# Patient Record
Sex: FEMALE | Race: OTHER | Employment: PART TIME | URBAN - METROPOLITAN AREA
[De-identification: names, ages, dates, MRNs, and addresses within clinical notes are randomized per-mention and may not be internally consistent; named-entity substitution may affect disease eponyms.]

---

## 2016-07-28 LAB
ANTIBODY SCREEN, EXTERNAL: NEGATIVE
CHLAMYDIA, EXTERNAL: NEGATIVE
HBSAG, EXTERNAL: NEGATIVE
HIV, EXTERNAL: NORMAL
N. GONORRHEA, EXTERNAL: NEGATIVE
RPR, EXTERNAL: NORMAL
RUBELLA, EXTERNAL: NORMAL
T. PALLIDUM, EXTERNAL: NEGATIVE
TYPE, ABO & RH, EXTERNAL: NORMAL

## 2017-01-13 ENCOUNTER — ROUTINE PRENATAL (OUTPATIENT)
Dept: FAMILY MEDICINE CLINIC | Age: 32
End: 2017-01-13

## 2017-01-13 NOTE — MR AVS SNAPSHOT
Visit Information Nafisa Fonseca Personal Médico Departamento Teléfono del Dep. Número de visita 1/13/2017 11:15 AM Minda Arrieta, Erwin Pritchett [phone] Your Appointments 1/17/2017  3:35 PM  
OB VISIT with Stephanie Larkin MD  
Erwin Pritchett ([address] [place] [address] Road) Appt Note: 33 weeks ob  
 [address] [facility],[address] [place] [address]  
[phone]  
  
   
 [address] [place] [address],[address] [address] [place] [id number] Upcoming Health Maintenance Date Due  
 PAP AKA CERVICAL CYTOLOGY 7/26/2019 DTaP/Tdap/Td series (2 - Td) 12/7/2026 [place]  Review Complete El: 12/27/2016 Por: Stephanie Larkin MD  
 A partir del:  1/13/2017 No Known Allergies Vacunas actuales [id number] el:  11/22/2016 [place] Holiday Influenza Vaccine (Quad) PF 10/25/2016 Tdap 12/7/2016 No revisadas esta visita Partes vitales LMP (última bright) Estado obstétrico Estatus de tabaquísmo 05/23/2016 Pregnant Never Smoker [place] lista de medicamentos actualizada Lista actualizada el: 1/13/17 11:23 AM.  Luis A Mcdowelly use [place]aca lista de medicamentos más reciente. PNV no.24-iron-folic acid-dha -399 mg-mcg-mg Cmpk Take  by mouth. raNITIdine 150 mg tablet También conocido elana:  ZANTAC Take 1 Tab by mouth two (2) times a day. Introducing [facility]! Estimado Kayla : 
Adolfo por solicitar charo cuenta de MyChart shabana. Nuestros registros indican que usted ya tiene charo cuenta [facility] [place]. Usted puede acceder a [place] cuenta en cualquier momento en [email or web address] Sabía usted que usted puede acceder a [place] hospital y las instrucciones de eliceo ER en cualquier momento en MyChart ? También puede revisar [facility] de las pruebas de [place] hospitalización o visita a urgencias . Pio Moreno 
 
 Si tiene [employer] , por favor visite la sección de preguntas frecuentes del sitio web MyChart en [email or web address] . Recuerde, MyChart NO es que se utilizará para las necesidades urgentes. Para emergencias médicas , llame al 911 . Ahora disponible en hobson iPhone y Android ! Por favor proporcione monica resumen de la documentación de cuidado a hobson próximo proveedor. Your primary care clinician is listed as [address] [place] [address], . If you have any questions after today's visit, please call [phone].

## 2017-01-17 ENCOUNTER — ROUTINE PRENATAL (OUTPATIENT)
Dept: FAMILY MEDICINE CLINIC | Age: 32
End: 2017-01-17

## 2017-01-17 VITALS
TEMPERATURE: 97.8 F | HEART RATE: 100 BPM | DIASTOLIC BLOOD PRESSURE: 72 MMHG | SYSTOLIC BLOOD PRESSURE: 104 MMHG | HEIGHT: 62 IN | WEIGHT: 158 LBS | OXYGEN SATURATION: 97 % | BODY MASS INDEX: 29.08 KG/M2 | RESPIRATION RATE: 16 BRPM

## 2017-01-17 DIAGNOSIS — Z34.83 PRENATAL CARE, SUBSEQUENT PREGNANCY IN THIRD TRIMESTER: ICD-10-CM

## 2017-01-17 DIAGNOSIS — L29.9 PRURITUS: Primary | ICD-10-CM

## 2017-01-17 NOTE — PROGRESS NOTES
Presents for prenatal care visit    + fetal movement  Denies bleeding or LOF    States that she has burning of the skin over her abdomen; states that in areas that it also feels numb  States that she occasionally has itching of the palms of her hands, during the day    Taking her prenatal vitamin    PE:  abd skin -- no sign of rash or excoriation; mild erythema, possibly secondary to rubbing across her skin    Will send labs for CMP and bile acids to rule out IHCP

## 2017-01-18 LAB
ALBUMIN SERPL-MCNC: 3.5 G/DL (ref 3.5–5.5)
ALBUMIN/GLOB SERPL: 1.6 {RATIO} (ref 1.1–2.5)
ALP SERPL-CCNC: 104 IU/L (ref 39–117)
ALT SERPL-CCNC: 45 IU/L (ref 0–32)
AST SERPL-CCNC: 33 IU/L (ref 0–40)
BILE AC SERPL-SCNC: 9.6 UMOL/L (ref 4.7–24.5)
BILIRUB SERPL-MCNC: 0.2 MG/DL (ref 0–1.2)
BUN SERPL-MCNC: 8 MG/DL (ref 6–20)
BUN/CREAT SERPL: 16 (ref 8–20)
CALCIUM SERPL-MCNC: 8.4 MG/DL (ref 8.7–10.2)
CHLORIDE SERPL-SCNC: 101 MMOL/L (ref 96–106)
CO2 SERPL-SCNC: 20 MMOL/L (ref 18–29)
CREAT SERPL-MCNC: 0.49 MG/DL (ref 0.57–1)
GLOBULIN SER CALC-MCNC: 2.2 G/DL (ref 1.5–4.5)
GLUCOSE SERPL-MCNC: 155 MG/DL (ref 65–99)
POTASSIUM SERPL-SCNC: 3.7 MMOL/L (ref 3.5–5.2)
PROT SERPL-MCNC: 5.7 G/DL (ref 6–8.5)
SODIUM SERPL-SCNC: 138 MMOL/L (ref 134–144)

## 2017-01-24 ENCOUNTER — ROUTINE PRENATAL (OUTPATIENT)
Dept: FAMILY MEDICINE CLINIC | Age: 32
End: 2017-01-24

## 2017-01-24 VITALS
WEIGHT: 157 LBS | HEART RATE: 109 BPM | HEIGHT: 62 IN | SYSTOLIC BLOOD PRESSURE: 97 MMHG | RESPIRATION RATE: 12 BRPM | TEMPERATURE: 98.7 F | DIASTOLIC BLOOD PRESSURE: 63 MMHG | BODY MASS INDEX: 28.89 KG/M2 | OXYGEN SATURATION: 96 %

## 2017-01-24 DIAGNOSIS — Z34.93 PRENATAL CARE, THIRD TRIMESTER: ICD-10-CM

## 2017-01-24 DIAGNOSIS — Z34.83 PRENATAL CARE, SUBSEQUENT PREGNANCY IN THIRD TRIMESTER: Primary | ICD-10-CM

## 2017-01-24 LAB
BILIRUB UR QL STRIP: NEGATIVE
GLUCOSE UR-MCNC: NEGATIVE MG/DL
KETONES P FAST UR STRIP-MCNC: NEGATIVE MG/DL
PH UR STRIP: 7.5 [PH] (ref 4.6–8)
PROT UR QL STRIP: NORMAL MG/DL
SP GR UR STRIP: 1.02 (ref 1–1.03)
UA UROBILINOGEN AMB POC: NORMAL (ref 0.2–1)
URINALYSIS CLARITY POC: NORMAL
URINALYSIS COLOR POC: YELLOW
URINE BLOOD POC: NEGATIVE
URINE LEUKOCYTES POC: NORMAL
URINE NITRITES POC: NEGATIVE

## 2017-01-24 NOTE — PROGRESS NOTES
Presents for prenatal care visit  + fetal movement  Denies bleeding or LOF    Advised that lab work was normal    GBS cx done     -- vaginal delivery at [facility]

## 2017-01-26 LAB
GP B STREP DNA SPEC QL NAA+PROBE: NEGATIVE
GRBS, EXTERNAL: NEGATIVE

## 2017-02-01 ENCOUNTER — ROUTINE PRENATAL (OUTPATIENT)
Dept: FAMILY MEDICINE CLINIC | Age: 32
End: 2017-02-01

## 2017-02-01 VITALS
HEART RATE: 100 BPM | TEMPERATURE: 98 F | SYSTOLIC BLOOD PRESSURE: 111 MMHG | HEIGHT: 62 IN | BODY MASS INDEX: 28.71 KG/M2 | DIASTOLIC BLOOD PRESSURE: 75 MMHG | OXYGEN SATURATION: 98 % | WEIGHT: 156 LBS

## 2017-02-01 DIAGNOSIS — Z34.83 PRENATAL CARE, SUBSEQUENT PREGNANCY IN THIRD TRIMESTER: Primary | ICD-10-CM

## 2017-02-01 LAB
BILIRUB UR QL STRIP: NEGATIVE
GLUCOSE UR-MCNC: NEGATIVE MG/DL
KETONES P FAST UR STRIP-MCNC: NEGATIVE MG/DL
PH UR STRIP: 7 [PH] (ref 4.6–8)
PROT UR QL STRIP: NEGATIVE MG/DL
SP GR UR STRIP: 1.01 (ref 1–1.03)
UA UROBILINOGEN AMB POC: NORMAL (ref 0.2–1)
URINALYSIS CLARITY POC: NORMAL
URINALYSIS COLOR POC: YELLOW
URINE BLOOD POC: NEGATIVE
URINE LEUKOCYTES POC: NORMAL
URINE NITRITES POC: NEGATIVE

## 2017-02-01 NOTE — PROGRESS NOTES
Chief Complaint   Patient presents with    Routine Prenatal Visit     36w2d     1. Have you been to the ER, urgent care clinic since your last visit? Hospitalized since your last visit? No    2. Have you seen or consulted any other health care providers outside of the [address] [place] [address] since your last visit? Include any pap smears or colon screening.  No

## 2017-02-01 NOTE — PROGRESS NOTES
Presents for prenatal care visit  + fetal movement  Denies bleeding or LOF    Denies any preeclampsia sxs    Occasional BH contractions, described as non-painful    GBS negative    F/U with Dr. Shelia Ngo next week

## 2017-02-08 ENCOUNTER — HOSPITAL ENCOUNTER (INPATIENT)
Age: 32
LOS: 3 days | Discharge: HOME | End: 2017-02-11
Attending: FAMILY MEDICINE | Admitting: OBSTETRICS & GYNECOLOGY
Payer: SUBSIDIZED

## 2017-02-08 ENCOUNTER — APPOINTMENT (OUTPATIENT)
Dept: GENERAL RADIOLOGY | Age: 32
End: 2017-02-08
Attending: FAMILY MEDICINE
Payer: SUBSIDIZED

## 2017-02-08 ENCOUNTER — SURGERY (OUTPATIENT)
Age: 32
End: 2017-02-08

## 2017-02-08 ENCOUNTER — ROUTINE PRENATAL (OUTPATIENT)
Dept: FAMILY MEDICINE CLINIC | Age: 32
End: 2017-02-08

## 2017-02-08 ENCOUNTER — ANESTHESIA EVENT (OUTPATIENT)
Dept: LABOR AND DELIVERY | Age: 32
End: 2017-02-08
Payer: SUBSIDIZED

## 2017-02-08 ENCOUNTER — ANESTHESIA (OUTPATIENT)
Dept: LABOR AND DELIVERY | Age: 32
End: 2017-02-08
Payer: SUBSIDIZED

## 2017-02-08 VITALS
HEIGHT: 62 IN | DIASTOLIC BLOOD PRESSURE: 74 MMHG | SYSTOLIC BLOOD PRESSURE: 109 MMHG | BODY MASS INDEX: 30.22 KG/M2 | TEMPERATURE: 98.4 F | HEART RATE: 97 BPM | WEIGHT: 164.2 LBS

## 2017-02-08 DIAGNOSIS — O36.8130 DECREASED FETAL MOVEMENT, THIRD TRIMESTER, NOT APPLICABLE OR UNSPECIFIED FETUS: ICD-10-CM

## 2017-02-08 DIAGNOSIS — Z34.93 PRENATAL CARE IN THIRD TRIMESTER: Primary | ICD-10-CM

## 2017-02-08 PROBLEM — O36.8190 DECREASED FETAL MOVEMENT DETERMINED BY EXAMINATION: Status: ACTIVE | Noted: 2017-02-08

## 2017-02-08 LAB
BILIRUB UR QL STRIP: NEGATIVE
GLUCOSE UR-MCNC: NEGATIVE MG/DL
KETONES P FAST UR STRIP-MCNC: NEGATIVE MG/DL
PH UR STRIP: 7.5 [PH] (ref 4.6–8)
PROT UR QL STRIP: NORMAL MG/DL
SP GR UR STRIP: 1.02 (ref 1–1.03)
UA UROBILINOGEN AMB POC: NORMAL (ref 0.2–1)
URINALYSIS CLARITY POC: CLEAR
URINALYSIS COLOR POC: YELLOW
URINE BLOOD POC: NEGATIVE
URINE LEUKOCYTES POC: NORMAL
URINE NITRITES POC: NEGATIVE

## 2017-02-08 PROCEDURE — 76060000033 HC ANESTHESIA 1 TO 1.5 HR: Performed by: OBSTETRICS & GYNECOLOGY

## 2017-02-08 PROCEDURE — 77030018836 HC SOL IRR NACL ICUM -A

## 2017-02-08 PROCEDURE — 65270000029 HC RM PRIVATE

## 2017-02-08 PROCEDURE — 77030002974 HC SUT PLN J&J -A

## 2017-02-08 PROCEDURE — 77030034850

## 2017-02-08 PROCEDURE — 76210000016 HC OR PH I REC 1 TO 1.5 HR

## 2017-02-08 PROCEDURE — 76060000078 HC EPIDURAL ANESTHESIA: Performed by: ANESTHESIOLOGY

## 2017-02-08 PROCEDURE — C1765 ADHESION BARRIER: HCPCS

## 2017-02-08 PROCEDURE — 74011250636 HC RX REV CODE- 250/636: Performed by: ANESTHESIOLOGY

## 2017-02-08 PROCEDURE — 77030032490 HC SLV COMPR SCD KNE COVD -B

## 2017-02-08 PROCEDURE — 99218 HC RM OBSERVATION: CPT

## 2017-02-08 PROCEDURE — 74000 XR ABD (KUB): CPT

## 2017-02-08 PROCEDURE — 74011250636 HC RX REV CODE- 250/636

## 2017-02-08 PROCEDURE — 76010000391 HC C SECN FIRST 1 HR: Performed by: OBSTETRICS & GYNECOLOGY

## 2017-02-08 PROCEDURE — 77030002933 HC SUT MCRYL J&J -A

## 2017-02-08 PROCEDURE — 77030008684 HC TU ET CUF COVD -B: Performed by: ANESTHESIOLOGY

## 2017-02-08 PROCEDURE — 76010000392 HC C SECN EA ADDL 0.5 HR: Performed by: OBSTETRICS & GYNECOLOGY

## 2017-02-08 PROCEDURE — 77030026438 HC STYL ET INTUB CARD -A: Performed by: ANESTHESIOLOGY

## 2017-02-08 PROCEDURE — 77030011640 HC PAD GRND REM COVD -A

## 2017-02-08 PROCEDURE — 77010026065 HC OXYGEN MINIMUM MEDICAL AIR: Performed by: OBSTETRICS & GYNECOLOGY

## 2017-02-08 PROCEDURE — 75410000003 HC RECOV DEL/VAG/CSECN EA 0.5 HR: Performed by: OBSTETRICS & GYNECOLOGY

## 2017-02-08 PROCEDURE — 99281 EMR DPT VST MAYX REQ PHY/QHP: CPT | Performed by: OBSTETRICS & GYNECOLOGY

## 2017-02-08 PROCEDURE — 77030008463 HC STPLR SKN PROX J&J -B

## 2017-02-08 PROCEDURE — 77010026064 HC OXYGEN INFANT MED AIR MIN: Performed by: OBSTETRICS & GYNECOLOGY

## 2017-02-08 PROCEDURE — 36415 COLL VENOUS BLD VENIPUNCTURE: CPT | Performed by: FAMILY MEDICINE

## 2017-02-08 PROCEDURE — 77030031139 HC SUT VCRL2 J&J -A

## 2017-02-08 RX ORDER — SODIUM CHLORIDE 0.9 % (FLUSH) 0.9 %
5-10 SYRINGE (ML) INJECTION AS NEEDED
Status: DISCONTINUED | OUTPATIENT
Start: 2017-02-08 | End: 2017-02-11 | Stop reason: HOSPADM

## 2017-02-08 RX ORDER — SIMETHICONE 80 MG
80 TABLET,CHEWABLE ORAL
Status: DISCONTINUED | OUTPATIENT
Start: 2017-02-08 | End: 2017-02-11 | Stop reason: HOSPADM

## 2017-02-08 RX ORDER — CEFAZOLIN SODIUM 1 G/3ML
INJECTION, POWDER, FOR SOLUTION INTRAMUSCULAR; INTRAVENOUS AS NEEDED
Status: DISCONTINUED | OUTPATIENT
Start: 2017-02-08 | End: 2017-02-08 | Stop reason: HOSPADM

## 2017-02-08 RX ORDER — DEXAMETHASONE SODIUM PHOSPHATE 4 MG/ML
INJECTION, SOLUTION INTRA-ARTICULAR; INTRALESIONAL; INTRAMUSCULAR; INTRAVENOUS; SOFT TISSUE AS NEEDED
Status: DISCONTINUED | OUTPATIENT
Start: 2017-02-08 | End: 2017-02-08 | Stop reason: HOSPADM

## 2017-02-08 RX ORDER — SODIUM CHLORIDE 0.9 % (FLUSH) 0.9 %
5-10 SYRINGE (ML) INJECTION EVERY 8 HOURS
Status: CANCELLED | OUTPATIENT
Start: 2017-02-08

## 2017-02-08 RX ORDER — NALOXONE HYDROCHLORIDE 0.4 MG/ML
0.4 INJECTION, SOLUTION INTRAMUSCULAR; INTRAVENOUS; SUBCUTANEOUS AS NEEDED
Status: DISCONTINUED | OUTPATIENT
Start: 2017-02-08 | End: 2017-02-11 | Stop reason: HOSPADM

## 2017-02-08 RX ORDER — HYDROMORPHONE HYDROCHLORIDE 1 MG/ML
0.5 INJECTION, SOLUTION INTRAMUSCULAR; INTRAVENOUS; SUBCUTANEOUS
Status: ACTIVE | OUTPATIENT
Start: 2017-02-08 | End: 2017-02-09

## 2017-02-08 RX ORDER — DIPHENHYDRAMINE HYDROCHLORIDE 50 MG/ML
12.5 INJECTION, SOLUTION INTRAMUSCULAR; INTRAVENOUS
Status: ACTIVE | OUTPATIENT
Start: 2017-02-08 | End: 2017-02-09

## 2017-02-08 RX ORDER — MIDAZOLAM HYDROCHLORIDE 1 MG/ML
1 INJECTION, SOLUTION INTRAMUSCULAR; INTRAVENOUS
Status: DISCONTINUED | OUTPATIENT
Start: 2017-02-08 | End: 2017-02-08 | Stop reason: HOSPADM

## 2017-02-08 RX ORDER — ONDANSETRON 2 MG/ML
INJECTION INTRAMUSCULAR; INTRAVENOUS AS NEEDED
Status: DISCONTINUED | OUTPATIENT
Start: 2017-02-08 | End: 2017-02-08 | Stop reason: HOSPADM

## 2017-02-08 RX ORDER — SODIUM CHLORIDE, SODIUM LACTATE, POTASSIUM CHLORIDE, CALCIUM CHLORIDE 600; 310; 30; 20 MG/100ML; MG/100ML; MG/100ML; MG/100ML
125 INJECTION, SOLUTION INTRAVENOUS CONTINUOUS
Status: DISCONTINUED | OUTPATIENT
Start: 2017-02-08 | End: 2017-02-11 | Stop reason: HOSPADM

## 2017-02-08 RX ORDER — SWAB
1 SWAB, NON-MEDICATED MISCELLANEOUS DAILY
Status: DISCONTINUED | OUTPATIENT
Start: 2017-02-09 | End: 2017-02-11 | Stop reason: HOSPADM

## 2017-02-08 RX ORDER — ZOLPIDEM TARTRATE 5 MG/1
5 TABLET ORAL
Status: DISCONTINUED | OUTPATIENT
Start: 2017-02-08 | End: 2017-02-11 | Stop reason: HOSPADM

## 2017-02-08 RX ORDER — OXYTOCIN/RINGER'S LACTATE 20/1000 ML
125-500 PLASTIC BAG, INJECTION (ML) INTRAVENOUS ONCE
Status: COMPLETED | OUTPATIENT
Start: 2017-02-08 | End: 2017-02-09

## 2017-02-08 RX ORDER — OXYTOCIN 10 [USP'U]/ML
INJECTION, SOLUTION INTRAMUSCULAR; INTRAVENOUS AS NEEDED
Status: DISCONTINUED | OUTPATIENT
Start: 2017-02-08 | End: 2017-02-08 | Stop reason: HOSPADM

## 2017-02-08 RX ORDER — SUCCINYLCHOLINE CHLORIDE 20 MG/ML
INJECTION INTRAMUSCULAR; INTRAVENOUS AS NEEDED
Status: DISCONTINUED | OUTPATIENT
Start: 2017-02-08 | End: 2017-02-08 | Stop reason: HOSPADM

## 2017-02-08 RX ORDER — KETOROLAC TROMETHAMINE 30 MG/ML
INJECTION, SOLUTION INTRAMUSCULAR; INTRAVENOUS AS NEEDED
Status: DISCONTINUED | OUTPATIENT
Start: 2017-02-08 | End: 2017-02-08 | Stop reason: HOSPADM

## 2017-02-08 RX ORDER — NALOXONE HYDROCHLORIDE 0.4 MG/ML
0.4 INJECTION, SOLUTION INTRAMUSCULAR; INTRAVENOUS; SUBCUTANEOUS AS NEEDED
Status: CANCELLED | OUTPATIENT
Start: 2017-02-08

## 2017-02-08 RX ORDER — OXYCODONE AND ACETAMINOPHEN 5; 325 MG/1; MG/1
1 TABLET ORAL
Status: DISCONTINUED | OUTPATIENT
Start: 2017-02-08 | End: 2017-02-11 | Stop reason: HOSPADM

## 2017-02-08 RX ORDER — MIDAZOLAM HYDROCHLORIDE 1 MG/ML
INJECTION, SOLUTION INTRAMUSCULAR; INTRAVENOUS AS NEEDED
Status: DISCONTINUED | OUTPATIENT
Start: 2017-02-08 | End: 2017-02-08 | Stop reason: HOSPADM

## 2017-02-08 RX ORDER — NALOXONE HYDROCHLORIDE 0.4 MG/ML
0.2 INJECTION, SOLUTION INTRAMUSCULAR; INTRAVENOUS; SUBCUTANEOUS
Status: DISCONTINUED | OUTPATIENT
Start: 2017-02-08 | End: 2017-02-11 | Stop reason: HOSPADM

## 2017-02-08 RX ORDER — ZOLPIDEM TARTRATE 5 MG/1
5 TABLET ORAL
Status: CANCELLED | OUTPATIENT
Start: 2017-02-08

## 2017-02-08 RX ORDER — SODIUM CHLORIDE 0.9 % (FLUSH) 0.9 %
5-10 SYRINGE (ML) INJECTION EVERY 8 HOURS
Status: DISCONTINUED | OUTPATIENT
Start: 2017-02-08 | End: 2017-02-09 | Stop reason: SDUPTHER

## 2017-02-08 RX ORDER — FENTANYL CITRATE 50 UG/ML
INJECTION, SOLUTION INTRAMUSCULAR; INTRAVENOUS AS NEEDED
Status: DISCONTINUED | OUTPATIENT
Start: 2017-02-08 | End: 2017-02-08 | Stop reason: HOSPADM

## 2017-02-08 RX ORDER — SODIUM CHLORIDE, SODIUM LACTATE, POTASSIUM CHLORIDE, CALCIUM CHLORIDE 600; 310; 30; 20 MG/100ML; MG/100ML; MG/100ML; MG/100ML
100 INJECTION, SOLUTION INTRAVENOUS CONTINUOUS
Status: CANCELLED | OUTPATIENT
Start: 2017-02-08

## 2017-02-08 RX ORDER — SODIUM CHLORIDE, SODIUM LACTATE, POTASSIUM CHLORIDE, CALCIUM CHLORIDE 600; 310; 30; 20 MG/100ML; MG/100ML; MG/100ML; MG/100ML
INJECTION, SOLUTION INTRAVENOUS
Status: DISCONTINUED | OUTPATIENT
Start: 2017-02-08 | End: 2017-02-08 | Stop reason: HOSPADM

## 2017-02-08 RX ORDER — OXYTOCIN/RINGER'S LACTATE 20/1000 ML
125-500 PLASTIC BAG, INJECTION (ML) INTRAVENOUS ONCE
Status: CANCELLED | OUTPATIENT
Start: 2017-02-08 | End: 2017-02-08

## 2017-02-08 RX ORDER — SIMETHICONE 80 MG
80 TABLET,CHEWABLE ORAL AS NEEDED
Status: CANCELLED | OUTPATIENT
Start: 2017-02-08

## 2017-02-08 RX ORDER — HYDROMORPHONE HCL IN 0.9% NACL 15 MG/30ML
PATIENT CONTROLLED ANALGESIA VIAL INTRAVENOUS CONTINUOUS
Status: DISPENSED | OUTPATIENT
Start: 2017-02-08 | End: 2017-02-09

## 2017-02-08 RX ORDER — PROPOFOL 10 MG/ML
INJECTION, EMULSION INTRAVENOUS AS NEEDED
Status: DISCONTINUED | OUTPATIENT
Start: 2017-02-08 | End: 2017-02-08 | Stop reason: HOSPADM

## 2017-02-08 RX ORDER — HYDROMORPHONE HYDROCHLORIDE 2 MG/ML
INJECTION, SOLUTION INTRAMUSCULAR; INTRAVENOUS; SUBCUTANEOUS AS NEEDED
Status: DISCONTINUED | OUTPATIENT
Start: 2017-02-08 | End: 2017-02-08 | Stop reason: HOSPADM

## 2017-02-08 RX ORDER — IBUPROFEN 800 MG/1
800 TABLET ORAL EVERY 8 HOURS
Status: DISCONTINUED | OUTPATIENT
Start: 2017-02-08 | End: 2017-02-11 | Stop reason: HOSPADM

## 2017-02-08 RX ORDER — HYDROMORPHONE HYDROCHLORIDE 1 MG/ML
1 INJECTION, SOLUTION INTRAMUSCULAR; INTRAVENOUS; SUBCUTANEOUS
Status: DISCONTINUED | OUTPATIENT
Start: 2017-02-08 | End: 2017-02-11 | Stop reason: HOSPADM

## 2017-02-08 RX ORDER — SODIUM CHLORIDE 0.9 % (FLUSH) 0.9 %
5-10 SYRINGE (ML) INJECTION AS NEEDED
Status: CANCELLED | OUTPATIENT
Start: 2017-02-08

## 2017-02-08 RX ORDER — HYDROMORPHONE HYDROCHLORIDE 1 MG/ML
0.5 INJECTION, SOLUTION INTRAMUSCULAR; INTRAVENOUS; SUBCUTANEOUS
Status: DISCONTINUED | OUTPATIENT
Start: 2017-02-08 | End: 2017-02-08 | Stop reason: HOSPADM

## 2017-02-08 RX ADMIN — DEXAMETHASONE SODIUM PHOSPHATE 4 MG: 4 INJECTION, SOLUTION INTRA-ARTICULAR; INTRALESIONAL; INTRAMUSCULAR; INTRAVENOUS; SOFT TISSUE at 18:32

## 2017-02-08 RX ADMIN — HYDROMORPHONE HYDROCHLORIDE 1 MG: 2 INJECTION, SOLUTION INTRAMUSCULAR; INTRAVENOUS; SUBCUTANEOUS at 18:31

## 2017-02-08 RX ADMIN — MIDAZOLAM HYDROCHLORIDE 2 MG: 1 INJECTION, SOLUTION INTRAMUSCULAR; INTRAVENOUS at 18:51

## 2017-02-08 RX ADMIN — OXYTOCIN 20 UNITS: 10 INJECTION, SOLUTION INTRAMUSCULAR; INTRAVENOUS at 18:36

## 2017-02-08 RX ADMIN — SUCCINYLCHOLINE CHLORIDE 100 MG: 20 INJECTION INTRAMUSCULAR; INTRAVENOUS at 17:58

## 2017-02-08 RX ADMIN — HYDROMORPHONE HYDROCHLORIDE 0.5 MG: 2 INJECTION, SOLUTION INTRAMUSCULAR; INTRAVENOUS; SUBCUTANEOUS at 18:42

## 2017-02-08 RX ADMIN — ONDANSETRON 4 MG: 2 INJECTION INTRAMUSCULAR; INTRAVENOUS at 18:32

## 2017-02-08 RX ADMIN — SODIUM CHLORIDE, SODIUM LACTATE, POTASSIUM CHLORIDE, CALCIUM CHLORIDE: 600; 310; 30; 20 INJECTION, SOLUTION INTRAVENOUS at 18:36

## 2017-02-08 RX ADMIN — FENTANYL CITRATE 150 MCG: 50 INJECTION, SOLUTION INTRAMUSCULAR; INTRAVENOUS at 18:04

## 2017-02-08 RX ADMIN — PROPOFOL 200 MG: 10 INJECTION, EMULSION INTRAVENOUS at 17:57

## 2017-02-08 RX ADMIN — FENTANYL CITRATE 100 MCG: 50 INJECTION, SOLUTION INTRAMUSCULAR; INTRAVENOUS at 18:09

## 2017-02-08 RX ADMIN — KETOROLAC TROMETHAMINE 30 MG: 30 INJECTION, SOLUTION INTRAMUSCULAR; INTRAVENOUS at 18:14

## 2017-02-08 RX ADMIN — HYDROMORPHONE HYDROCHLORIDE 0.5 MG: 2 INJECTION, SOLUTION INTRAMUSCULAR; INTRAVENOUS; SUBCUTANEOUS at 18:22

## 2017-02-08 RX ADMIN — OXYTOCIN 20 UNITS: 10 INJECTION, SOLUTION INTRAMUSCULAR; INTRAVENOUS at 17:58

## 2017-02-08 RX ADMIN — Medication: at 19:12

## 2017-02-08 RX ADMIN — CEFAZOLIN SODIUM 2 G: 1 INJECTION, POWDER, FOR SOLUTION INTRAMUSCULAR; INTRAVENOUS at 18:02

## 2017-02-08 RX ADMIN — SODIUM CHLORIDE, SODIUM LACTATE, POTASSIUM CHLORIDE, CALCIUM CHLORIDE: 600; 310; 30; 20 INJECTION, SOLUTION INTRAVENOUS at 17:57

## 2017-02-08 RX ADMIN — HYDROMORPHONE HYDROCHLORIDE 1 MG: 1 INJECTION, SOLUTION INTRAMUSCULAR; INTRAVENOUS; SUBCUTANEOUS at 19:49

## 2017-02-08 NOTE — PROGRESS NOTES
Patient is   37w2d  GBS neg    Follow-up OB visit    Chief Complaint   Patient presents with    Routine Prenatal Visit     37w2d       Vitals:    17 1540   BP: 109/74   Pulse: 97   Temp: 98.4 °F (36.9 °C)   TempSrc: Oral   Weight: 164 lb 3.2 oz (74.5 kg)   Height: 5' 2\" (1.575 m)       Patient Active Problem List    Diagnosis Date Noted    Vaginal discharge during pregnancy in third trimester 2016    Cough 10/25/2016    Gastroesophageal reflux disease without esophagitis 10/25/2016    Prenatal care, subsequent pregnancy in third trimester 2016       The patient reports the following concerns: pelvic pain    Flu vaccine: received this season  Tdap: complete    See PN flowsheet for exam    36 y.o.  37w2d   No diagnosis found. [] SAB/bleeding precautions reviewed   [] PTL/PPROM precautions reviewed   [] Labor precautions reviewed   [] Fetal kick counts discussed   [] Labs reviewed with patient   [] Cristiano Shark precautions reviewed   [] Consent reviewed   [] Handouts given to pt   [] Glucola handout    [] GBS/labor/Magic Hour handout   []    []    []    []    Follow-up Disposition: Not on File    No orders of the defined types were placed in this encounter.         Lexy Whittington MD

## 2017-02-08 NOTE — IP AVS SNAPSHOT
Current Discharge Medication List  
  
Take these medications at their scheduled times Dose & Instructions Dispensing Information Comments Morning Noon Evening Bedtime  
 docusate sodium 100 mg capsule Commonly known as:  Rosanne Dilling Your next dose is: Today, Tomorrow Other:  ____________ Dose:  100 mg Take 1 Cap by mouth two (2) times a day for 90 days. Indications: Constipation Quantity:  60 Cap Refills:  2  
     
   
   
   
  
 raNITIdine 150 mg tablet Commonly known as:  ZANTAC Your next dose is: Today, Tomorrow Other:  ____________ Dose:  150 mg Take 1 Tab by mouth two (2) times a day. Quantity:  60 Tab Refills:  1 Take these medications as needed Dose & Instructions Dispensing Information Comments Morning Noon Evening Bedtime  
 ibuprofen 800 mg tablet Commonly known as:  MOTRIN Your next dose is: Today, Tomorrow Other:  ____________ Dose:  800 mg Take 1 Tab by mouth every eight (8) hours as needed for Pain. Quantity:  90 Tab Refills:  0  
     
   
   
   
  
 oxyCODONE-acetaminophen 5-325 mg per tablet Commonly known as:  PERCOCET Your next dose is: Today, Tomorrow Other:  ____________ Dose:  1 Tab Take 1 Tab by mouth every four (4) hours as needed. Max Daily Amount: 6 Tabs. Quantity:  20 Tab Refills:  0  
     
   
   
   
  
 zolpidem 5 mg tablet Commonly known as:  AMBIEN Your next dose is: Today, Tomorrow Other:  ____________ Dose:  5 mg Take 1 Tab by mouth nightly as needed for Sleep. Max Daily Amount: 5 mg. Quantity:  15 Tab Refills:  0 Take these medications as directed Dose & Instructions Dispensing Information Comments Morning Noon Evening Bedtime PNV no.24-iron-folic acid-dha -674 mg-mcg-mg Cmpk Your next dose is: Today, Tomorrow Other:  ____________ Take  by mouth. Refills:  0 Where to Get Your Medications Information about where to get these medications is not yet available ! Ask your nurse or doctor about these medications  
  docusate sodium 100 mg capsule  
 ibuprofen 800 mg tablet  
 oxyCODONE-acetaminophen 5-325 mg per tablet  
 zolpidem 5 mg tablet

## 2017-02-08 NOTE — PATIENT INSTRUCTIONS
Week 37 of Your Pregnancy: Care Instructions  Your Care Instructions    You are near the end of your pregnancy--and you're probably pretty uncomfortable. It may be harder to walk around. Lying down probably isn't comfortable either. You may have trouble getting to sleep or staying asleep. Most women deliver their babies between 40 and 41 weeks. This is a good time to think about packing a bag for the hospital with items you'll need. Then you'll be ready when labor starts. Follow-up care is a key part of your treatment and safety. Be sure to make and go to all appointments, and call your doctor if you are having problems. It's also a good idea to know your test results and keep a list of the medicines you take. How can you care for yourself at home? Learn about breastfeeding  · Breastfeeding is best for your baby and good for you. · Breast milk has antibodies to help your baby fight infections. · Mothers who breastfeed often lose weight faster, because making milk burns calories. · Learning the best ways to hold your baby will make breastfeeding easier. · Let your partner bathe and diaper the baby to keep your partner from feeling left out. Snuggle together when you breastfeed. · You may want to learn how to use a breast pump and store your milk. · If you choose to bottle feed, make the feeding feel like breastfeeding so you can bond with your baby. Always hold your baby and the bottle. Do not prop bottles or let your baby fall asleep with a bottle. Learn about crying  · It is common for babies to cry for 1 to 3 hours a day. Some cry more, some cry less. · Babies don't cry to make you upset or because you are a bad parent. · Crying is how your baby communicates. Your baby may be hungry; have gas; need a diaper change; or feel cold, warm, tired, lonely, or tense. Sometimes babies cry for unknown reasons. · If you respond to your baby's needs, he or she will learn to trust you.   · Try to stay calm when your baby cries. Your baby may get more upset if he or she senses that you are upset. Know how to care for your   · Your baby's umbilical cord stump will drop off on its own, usually between 1 and 2 weeks. To care for your baby's umbilical cord area:  ¨ Clean the area at the bottom of the cord 2 or 3 times a day. ¨ Pay special attention to the area where the cord attaches to the skin. ¨ Keep the diaper folded below the cord. ¨ Use a damp washcloth or cotton ball to sponge bathe your baby until the stump has come off. · Your baby's first dark stool is called meconium. After the meconium is passed, your baby will develop his or her own bowel pattern. ¨ Some babies, especially  babies, have several bowel movements a day. Others have one or two a day, or one every 2 to 3 days. ¨  babies often have loose, yellow stools. Formula-fed babies have more formed stools. ¨ If your baby's stools look like little pellets, he or she is constipated. After 2 days of constipation, call your baby's doctor. · If your baby will be circumcised, you can care for him at home. ¨ Gently rinse his penis with warm water after every diaper change. Do not try to remove the film that forms on the penis. This film will go away on its own. Pat dry. ¨ Put petroleum ointment, such as Vaseline, on the area of the diaper that will touch your baby's penis. This will keep the diaper from sticking to your baby. ¨ Ask the doctor about giving your baby acetaminophen (Tylenol) for pain. Where can you learn more? Go to http://rui-pj.info/. Enter 68 21 [id number] in the search box to learn more about \"Week 37 of Your Pregnancy: Care Instructions. \"  Current as of: May 30, 2016  Content Version: 11.1  © 7522-4232 [employer]. Care instructions adapted under license by [email or web address] (which disclaims liability or warranty for this information).  If you have questions about a medical condition or this instruction, always ask your healthcare professional. [place] [address] any warranty or liability for your use of this information.

## 2017-02-08 NOTE — ANESTHESIA PREPROCEDURE EVALUATION
Anesthetic History               Review of Systems / Medical History      Pulmonary                   Neuro/Psych              Cardiovascular                  Exercise tolerance: >4 METS     GI/Hepatic/Renal                Endo/Other             Other Findings              Physical Exam    Airway        Mouth opening: Normal     Cardiovascular               Dental         Pulmonary                 Abdominal  GI exam deferred       Other Findings            Anesthetic Plan    ASA: 2, emergent  Anesthesia type: general      Post-op pain plan if not by surgeon: IV PCA    Induction: Intravenous and RSI  Anesthetic plan and risks discussed with: Patient      NOTE: Pt came to hospital and was immediately taken for stat  due to fetal distress. Pt in OR on my arrival, limited ability to complete preop exam; This note written late at [address] [place] [address], discussed plan with patient prior to induction of anesthesia.

## 2017-02-08 NOTE — IP AVS SNAPSHOT
Cecilia Ventura 104 1007 [facility] 
[phone] Patient: Praveen Suggs MRN: [id number] MSQ:6/45/6253 You are allergic to the following No active allergies Recent Documentation Height Weight Breastfeeding? BMI OB Status Smoking Status 1.575 m 74.4 kg No 30 kg/m2 Pregnant Never Smoker Unresulted Labs Order Current Status SAMPLE TO BLOOD BANK In process Emergency Contacts Name Discharge Info Relation Home Work Mobile Jackeline Mchughcy DISCHARGE CAREGIVER [3] Other Relative [6] [phone] Eliza Longoria [5] [phone] About your hospitalization You were admitted on:  February 8, 2017 You last received care in the:  [facility] 2 LABOR & DELIVERY You were discharged on:  February 11, 2017 Unit phone number:  [phone] Why you were hospitalized Your primary diagnosis was:  Not on File Your diagnoses also included:  Decreased Fetal Movement Determined By Examination Providers Seen During Your Hospitalizations Provider Role Specialty Primary office phone Vikram Crook MD Attending Provider [facility] [phone] Your Primary Care Physician (PCP) Primary Care Physician Office Phone Office Fax ISAÍAS ZACARIAS [phone] ** None ** Follow-up Information Follow up With Details Comments Contact Info Vikram Crook MD Go on 2/15/2017 at 10.05 pm, For follow up visit [address] [place] [address] [employer] [address] [place] [address] 
[phone] Your Appointments Wednesday February 15, 2017 10:05 AM EST  
OB VISIT with Vikram Crook MD  
[address] [place] [address])  
 [address] [place] [address]  
[phone] Current Discharge Medication List  
  
START taking these medications Dose & Instructions Dispensing Information Comments Morning Noon Evening Bedtime  
 docusate sodium 100 mg capsule Commonly known as:  Annasael Jelani Your next dose is: Today, Tomorrow Other:  _________ Dose:  100 mg Take 1 Cap by mouth two (2) times a day for 90 days. Indications: Constipation Quantity:  60 Cap Refills:  2  
     
   
   
   
  
 ibuprofen 800 mg tablet Commonly known as:  MOTRIN Your next dose is: Today, Tomorrow Other:  _________ Dose:  800 mg Take 1 Tab by mouth every eight (8) hours as needed for Pain. Quantity:  90 Tab Refills:  0  
     
   
   
   
  
 oxyCODONE-acetaminophen 5-325 mg per tablet Commonly known as:  PERCOCET Your next dose is: Today, Tomorrow Other:  _________ Dose:  1 Tab Take 1 Tab by mouth every four (4) hours as needed. Max Daily Amount: 6 Tabs. Quantity:  20 Tab Refills:  0  
     
   
   
   
  
 zolpidem 5 mg tablet Commonly known as:  AMBIEN Your next dose is: Today, Tomorrow Other:  _________ Dose:  5 mg Take 1 Tab by mouth nightly as needed for Sleep. Max Daily Amount: 5 mg. Quantity:  15 Tab Refills:  0 CONTINUE these medications which have NOT CHANGED Dose & Instructions Dispensing Information Comments Morning Noon Evening Bedtime PNV no.24-iron-folic acid-dha -602 mg-mcg-mg Cmpk Your next dose is: Today, Tomorrow Other:  _________ Take  by mouth. Refills:  0  
     
   
   
   
  
 raNITIdine 150 mg tablet Commonly known as:  ZANTAC Your next dose is: Today, Tomorrow Other:  _________ Dose:  150 mg Take 1 Tab by mouth two (2) times a day. Quantity:  60 Tab Refills:  1 Where to Get Your Medications Information on where to get these meds will be given to you by the nurse or doctor. ! Ask your nurse or doctor about these medications  
  docusate sodium 100 mg capsule  
 ibuprofen 800 mg tablet  
 oxyCODONE-acetaminophen 5-325 mg per tablet  
 zolpidem 5 mg tablet Discharge Instructions Parto por cesárea: Jenness Ormond en el hogar - [  Section: What to Expect at [facility] ] Menchaca recuperación Un parto por cesárea, o simplemente cesárea, es charo cirugía mediante la cual se da a luigi a un bebé a través de un allie, llamado incisión, que hace el médico en la parte baja del abdomen y [place]. Es posible que sienta dolor en la parte baja del abdomen y que necesite analgésicos (medicamentos para el dolor) ben 1 o 2 semanas. Puede esperar tener algo de sangrado vaginal ben varias semanas. Es probable que necesite unas 6 semanas para recuperarse completamente. Es importante que se tome las cosas con calma mientras chuyita la incisión. Evite levantar objetos pesados, hacer actividades vigorosas o hacer ejercicios que pudieran esforzar los músculos abdominales mientras se recupera. [place] a un familiar o amigo que la ayude con las tareas domésticas, la comida y las compras. Esta hoja de [employer] idea general del tiempo que le llevará recuperarse. Sin embargo, cada persona se recupera a un ritmo diferente. Siga los pasos que se mencionan a continuación para recuperarse lo más rápido posible. Cómo puede cuidarse en el [facility]? Actividad · Descanse cuando se sienta cansada. Dormir lo suficiente la ayudará a recuperarse. · Intente caminar todos los días. Comience caminando un poco más de lo que caminó el día anterior. Poco a poco, aumente la distancia. Caminar mejora el flujo de [place] a prevenir la neumonía, el estreñimiento y los coágulos de [place]. · Evite las actividades intensas, elana montar en bicicleta, trotar, levantar pesas y hacer ejercicios aeróbicos ben 6 semanas o hasta que menchaca médico lo apruebe. · Hasta que hobson médico lo apruebe, no levante nada más pesado que hobson bebé. · No regis abdominales ni ningún otro ejercicio que [employer] músculos del abdomen ben 6 semanas o hasta que hobson médico lo apruebe. · Sostenga charo almohada sobre la incisión al toser o respirar profundamente. Lindaakylie Rosenberg apoyo a hobson abdomen y reducirá el dolor. · Puede ducharse elana de costumbre. Seque la incisión con toques suaves de toalla cuando termine. · Tendrá algo de sangrado vaginal. Use toallas sanitarias. No se regis lavados vaginales ni use tampones hasta que el médico se lo permita. · Pregúntele a hobson médico cuándo puede volver a conducir. · Es probable que necesite ausentarse del trabajo por lo menos 6 semanas. [place] dependerá del tipo de trabajo que regis y de cómo se sienta. · Pregúntele a hobson médico cuándo puede tener [employer]. Alimentación · Puede continuar con hobson dieta normal. Si tiene [employer], coma alimentos suaves bajos en grasa, elana arroz sin condimentar, debbi a la scott, pan dimitri y yogur. · Radha abundantes líquidos (a menos que hobson médico le indique lo contrario). · Podría notar que no evacua el intestino con regularidad ricky después de la [facility]. [place] es común. Trate de evitar el estreñimiento y no hacer esfuerzos cuando evacua el intestino. Solis vez desee alison un suplemento de [employer]. Si no ha evacuado el intestino después de un par de días, pregúntele a hobson médico si puede alison un laxante suave. · Si está amamantando, no radha alcohol. Medicamentos · Hobson médico le dirá si puede volver a alison pilar medicamentos y cuándo puede volver a hacerlo. También le dará indicaciones sobre cualquier medicamento nuevo que deba alison usted.  
· Si momo medicamentos que previenen la formación de coágulos de [place], elana warfarina (Coumadin), clopidogrel (Plavix) o aspirina, asegúrese de hablar con hobson médico. Él o diomedes le dirá si debe volver a alison [employer] medicamentos y en qué momento. Asegúrese de que entiende exactamente lo que el médico quiere que regis. · [employer] analgésicos (medicamentos para el dolor) exactamente elana le fueron indicados. ¨ Si el médico le recetó un analgésico, tómelo según las indicaciones. ¨ Si no está tomando un analgésico recetado, pregúntele a hobson médico si puede alison bernadette de [employer]. · Si le parece que el analgésico le está produciendo malestar estomacal: [address] comidas (a menos que hobson médico le haya indicado lo contrario). ¨ [place] al médico un analgésico diferente. · Si hobson médico le recetó antibióticos, tómelos según las indicaciones. No deje de tomarlos por el hecho de sentirse mejor. Debe alison todos los antibióticos hasta terminarlos. Cuidado de la incisión · Si tiene tiras de cinta [employer] incisión, déjeselas puestas ben charo semana o hasta que se caigan por sí solas. · Lave la quan a diario con [place] tibia y séquela con toques suaves de toalla. No use peróxido de hidrógeno [place]) o alcohol porque pueden retrasar la sanación. Podría cubrir la quan con charo venda de gasa si supura o roza contra la ropa. Cambie la venda todos los días. · Mantenga la quan limpia y seca. Otras instrucciones · Si amamanta a hobson bebé, cam vez le resulte más cómodo, ben el proceso de sanación, sostener al bebé de [place] en la que no se apoye en hobson abdomen. Intente poner a hobson bebé debajo del brazo, con el cuerpo del lado que lo Devin Borjas a amamantar. Sostenga la parte superior del cuerpo de hobson bebé con hobson Phylicia Mends. Con violeta mano usted puede controlar la jaqui de hobson bebé para acercarle la boca a hobson seno. [place] se conoce a veces elana \"posición de balón de fútbol americano\". La atención de seguimiento es charo parte clave de hobson tratamiento y seguridad. Asegúrese de hacer y acudir a todas las citas, y llame a hobson médico si está teniendo problemas.  También es charo buena idea [employer] resultados de pilar exámenes y mantener charo lista de los medicamentos que momo. Cuándo debe pedir ayuda? Llame al 911 en cualquier momento que considere que necesita atención de [place]. Por ejemplo, llame si: 
· Se desmayó (perdió el conocimiento). · Tiene síntomas de un coágulo de opal en el pulmón (llamado embolia pulmonar). Estos pueden incluir: ¨ Dolor repentino en el pecho. ¨ Dificultad para respirar. ¨ Toser opal. · Piensa en hacerse daño a sí misma o en lastimar a hobson bebé o a otra persona. Llame a hobson médico ahora mismo o busque atención médica inmediata si: · Tiene sangrado vaginal intenso. [place] significa que empapa charo toalla sanitaria cada hora ben 2 horas o más. · Se siente mareada o aturdida, o elana si fuera a desmayarse. · Tiene dolor abdominal nuevo o que empeora. · Tiene puntos de sutura flojos o se le abre la incisión. · Tiene síntomas de infección, tales elana: ¨ Aumento del dolor, la hinchazón, el enrojecimiento o la temperatura. ¨ Vetas rojizas que salen de la incisión. ¨ Pus que supura de la incisión. Zen Lea. · Tiene síntomas de un coágulo de opal en la pierna (llamado trombosis venosa profunda), tales elana: ¨ Dolor en la pantorrilla, el muslo, la jorge o detrás de la rodilla. ¨ Enrojecimiento e hinchazón en la pierna o la jorge. Preste especial atención a los cambios en hobson willie y asegúrese de comunicarse con hobson médico si: 
· Se siente vel, ansiosa o desesperanzada ben más de unos días. · No mejora elana se esperaba. Dónde puede encontrar más información en inglés? Curry Chloe a http://rui-pj.info/. Cynthia Rivera S156 en la búsqueda para aprender más acerca de \"Parto por cesárea: Fernando Ravi en el [facility] - [  Section: What to Expect at [facility] ]. \" 
Revisado: 30 solomon, 2016 Versión del contenido: 11.1 © 7986-3894 [employer].  Las instrucciones de cuidado fueron adaptadas bajo licencia por [employer] (which disclaims liability or warranty for this information). Si usted tiene [place] afección médica o sobre estas instrucciones, siempre pregunte a hobson profesional de willie. [employer] niega toda garantía o responsabilidad por hobson uso de esta información. Discharge Orders None [email or web address] Announcement We are excited to announce that we are making your provider's discharge notes available to you in [email or web address]. You will see these notes when they are completed and signed by the physician that discharged you from your recent hospital stay. If you have any questions or concerns about any information you see in [employer]It[employer], please call the Health Information Department where you were seen or reach out to your Primary Care Provider for more information about your plan of care. Introducing [facility]! Dear Arminda Baldwin: Thank you for requesting a [employer]It[employer]Us account. Our records indicate that you already have an active [employer] account. You can access your account anytime at [email or web address]. [employer] Did you know that you can access your hospital and ER discharge instructions at any time in [employer]It[employer]? You can also review all of your test results from your hospital stay or ER visit. Additional Information If you have questions, please visit the Frequently Asked Questions section of the [employer]It[employer] website at [email or web address] [email or web address]/. Remember, CookIt[employer]Us is NOT to be used for urgent needs. For medical emergencies, dial 911. Now available from your iPhone and Android! General Information Please provide this summary of care documentation to your next provider. Patient Signature:  ____________________________________________________________ Date:  ____________________________________________________________  
  
Maikol Artist  Provider Signature: ____________________________________________________________ Date:  ____________________________________________________________

## 2017-02-08 NOTE — CONSULTS
Neonatology Consultation    Name: Panchito Shi   Medical Record Number: [id number]   YOB: 1985  Today's Date: 2017                                                                 Date of Consultation:  2017  Time: 6:42 PM  Attending MD: Asmita Suh  Referring Physician: Wing Moreira  Reason for Consultation: emergent C/S for fetal bradycardia    Subjective:     Prenatal Labs: O+/RI/ T pall neg/ HIV neg/Hep B neg/GBS neg  Age: 36 y.o.  [place] Law:   Estimated Date Conception:    Estimated Gestation:RITU 17 for EGA 37    Objective:       Per outpatient practitioner note from earlier this afternoon, mother presented for routine prenatal visit. She reported decreased fetal movement for the past several days and none today. She had a non-reactive NST in the office and was sent to [facility] for assessment and monitoring. Per RN and OB report fetus with low heart and poor variability. Taken to the OR for emergent C/S.     Mother's pregnancy appeared to otherwise be normal. 20 week ultrasound showed normal anatomy and MSAFP was normal.    Medications:   Current Facility-Administered Medications   Medication Dose Route Frequency    HYDROmorphone (PF) 15 mg/30 ml (DILAUDID) PCA   IntraVENous CONTINUOUS     Facility-Administered Medications Ordered in Other Encounters   Medication Dose Route Frequency    fentaNYL citrate (PF) injection    PRN    succinylcholine (ANECTINE) injection   IntraVENous PRN    propofol (DIPRIVAN) 10 mg/mL injection   IntraVENous PRN    lactated ringers infusion   IntraVENous CONTINUOUS    oxytocin (PITOCIN) injection   IntraVENous PRN    ceFAZolin (ANCEF) injection   IntraVENous PRN    HYDROmorphone (PF) (DILAUDID) injection    PRN    ketorolac (TORADOL) injection    PRN    dexamethasone (DECADRON) 4 mg/mL injection    PRN    ondansetron (ZOFRAN) injection    PRN     Anesthesia: []    None     []     Local         []     Epidural/Spinal [x]    General Anesthesia   Delivery:      []    Vaginal  [x]      []     Forceps             []     Vacuum  Rupture of Membrane: at delivery  Meconium Stained: meconium-stained    Resuscitation:   Apgars: 0 1 min  0 5 min  0 10 min 0 15 min    Infant brought to warmer, floppy, pale, apneic. Placed on warmer, suctioned, dried and stimulated without response. PPV started immediately and there was no chest movement noted. PIP increased and no chest movement was noted. HR ausculation attempted during preparation for intubation and was absent and chest compressions were started. Intubation attempted with 3.5 ETT. There was no chest movement following attempted intubation and CO2 detector without change. ETT removed and second intubation attempted. ETT clearly passed through the arytenoids but unable to pass ETT further. PPV with mask restarted with no chest movement. Intubation attempted with 3.0 ETT with same result. Intubation then attempted by anesthesiologist. ETT passed futher but no air movement or CO detection. Decision made to proceed with resuscitation. Infant given 0.3 ml epi at 7 min of age without response. Epi repeated at 8 min and again at 10 min without response. UVC placed by 13  Minutes and epi 0.3 ml given via UVC. There was no HR detected and no spontaneous movement. Resuscitation discontinued at 14 1/2 minutes. Physical Exam:    Dysmorphic Features:  []    No   [x]    Yes      Remarkable findings: Infant with protuberant, tense abdomen noted when first brought to warmer. There was also generalized edema present. Both findings consistent with hydrops fetalis. Cord AB.86/123/21.2/-15.4   Cord VBG pending    Assessment:     Term fetus with presumed hydrops fetalis of unknown etiology with intrauterine demise. Plan:     Attempt to obtain consent for autopsy, if able  Provide family support as needed    Dr. Cristine Mack attempted to speak with mother in the PACU.  Per report mother woke and was very agitated. She was given a sedative by the anesthesiologist. Dr. Marylee Nova told mother that her infant was born dead and that team was not able to revive him. Mother sleepy and crying. Will attempt to speak with her later this evening, if able.        Kaelyn Fitzgerald MD 4/7/23783:89 PM

## 2017-02-08 NOTE — PROGRESS NOTES
Return OB Visit       Subjective:   Kenia Bobo 36 y.o.  37w2d. RITU: 2017, by Last Menstrual Period      Reports no VB, LOF or contractions. C/o decreased FM: states was doing kick counts the end of last week which were normal.  Over the weekend baby was making small movements but less so than normal, decreased kick counts. Hasn't felt baby move today. Isn't sure if she felt baby yesterday. States drinking plenty of water and eating normally. Immunizations- reviewed:   Immunization History   Administered Date(s) Administered    Influenza Vaccine (Quad) PF 10/25/2016    Tdap 2016     Objective:     Visit Vitals    /74 (BP 1 Location: Left arm, BP Patient Position: Sitting)    Pulse 97    Temp 98.4 °F (36.9 °C) (Oral)    Ht 5' 2\" (1.575 m)    Wt 164 lb 3.2 oz (74.5 kg)    LMP 2016    BMI 30.03 kg/m2       Physical Exam:  GENERAL APPEARANCE: NAD, pleasant  ABDOMEN: gravid  SVE: 2/60/-2  NST:  FHT present at 140-150 bpm, no accels, no decels, minimal to no variability  SONO: cephalic, OP position       Labs  Recent Results (from the past 12 hour(s))   AMB POC URINALYSIS DIP STICK AUTO W/O MICRO    Collection Time: 17  4:00 PM   Result Value Ref Range    Color (UA POC) Yellow     Clarity (UA POC) Clear     Glucose (UA POC) Negative Negative    Bilirubin (UA POC) Negative Negative    Ketones (UA POC) Negative Negative    Specific gravity (UA POC) 1.020 1.001 - 1.035    Blood (UA POC) Negative Negative    pH (UA POC) 7.5 4.6 - 8.0    Protein (UA POC) 1+ Negative mg/dL    Urobilinogen (UA POC) 0.2 mg/dL 0.2 - 1    Nitrites (UA POC) Negative Negative    Leukocyte esterase (UA POC) 1+ Negative         Assessment          ICD-10-CM ICD-9-CM    1. Prenatal care in third trimester Z34.93 V22.1 AMB POC URINALYSIS DIP STICK AUTO W/O MICRO   2.  Decreased fetal movement, third trimester, not applicable or unspecified fetus O36.8130 655.73          Plan   32yo  @ 37.2 by LMP c/w 20 week sono  1. IUP: GBS negative, RH pos, passed GTT, s/p flu and tdap, cephalic by sono in clinic   2. Decreased FM: NST nonreactive in clinic, sending to L&D for prolonged monitoring and if no improvement then IOL (pt advised to go straight from clinic to L&D, not to go home first), favorable bishops on my exam       Orders Placed This Encounter    AMB POC URINALYSIS DIP STICK AUTO W/O MICRO         Labor precautions discussed, including: Regular painful contractions, lasting for greater than one hour, taking your breath away; any vaginal bleeding; any leakage of fluid; or absent or decreased fetal movement. Call M.D. on call if any of these symptoms or signs occur. I have discussed the diagnosis with the patient and the intended plan as seen in the above orders. The patient has received an after-visit summary and questions were answered concerning future plans. I have discussed medication side effects and warnings with the patient as well. Informed pt to return to the office or go to the ER if she experiences vaginal bleeding, vaginal discharge, leaking of fluid, pelvic cramping.       Francie Brady, DO

## 2017-02-09 LAB
BASOPHILS # BLD AUTO: 0 K/UL (ref 0–0.1)
BASOPHILS # BLD: 0 % (ref 0–1)
EOSINOPHIL # BLD: 0 K/UL (ref 0–0.4)
EOSINOPHIL NFR BLD: 0 % (ref 0–7)
ERYTHROCYTE [DISTWIDTH] IN BLOOD BY AUTOMATED COUNT: 13.7 % (ref 11.5–14.5)
HCT VFR BLD AUTO: 34.3 % (ref 35–47)
HGB BLD-MCNC: 11.4 G/DL (ref 11.5–16)
LYMPHOCYTES # BLD AUTO: 10 % (ref 12–49)
LYMPHOCYTES # BLD: 1.6 K/UL (ref 0.8–3.5)
MCH RBC QN AUTO: 29.8 PG (ref 26–34)
MCHC RBC AUTO-ENTMCNC: 33.2 G/DL (ref 30–36.5)
MCV RBC AUTO: 89.6 FL (ref 80–99)
MONOCYTES # BLD: 1 K/UL (ref 0–1)
MONOCYTES NFR BLD AUTO: 6 % (ref 5–13)
NEUTS SEG # BLD: 13.7 K/UL (ref 1.8–8)
NEUTS SEG NFR BLD AUTO: 84 % (ref 32–75)
PLATELET # BLD AUTO: 195 K/UL (ref 150–400)
RBC # BLD AUTO: 3.83 M/UL (ref 3.8–5.2)
WBC # BLD AUTO: 16.3 K/UL (ref 3.6–11)

## 2017-02-09 PROCEDURE — 36415 COLL VENOUS BLD VENIPUNCTURE: CPT | Performed by: OBSTETRICS & GYNECOLOGY

## 2017-02-09 PROCEDURE — 74011250636 HC RX REV CODE- 250/636: Performed by: OBSTETRICS & GYNECOLOGY

## 2017-02-09 PROCEDURE — 99218 HC RM OBSERVATION: CPT

## 2017-02-09 PROCEDURE — 77030027138 HC INCENT SPIROMETER -A

## 2017-02-09 PROCEDURE — 88285 CHROMOSOME COUNT ADDITIONAL: CPT | Performed by: OBSTETRICS & GYNECOLOGY

## 2017-02-09 PROCEDURE — 88233 TISSUE CULTURE SKIN/BIOPSY: CPT | Performed by: OBSTETRICS & GYNECOLOGY

## 2017-02-09 PROCEDURE — 88280 CHROMOSOME KARYOTYPE STUDY: CPT | Performed by: OBSTETRICS & GYNECOLOGY

## 2017-02-09 PROCEDURE — 88307 TISSUE EXAM BY PATHOLOGIST: CPT | Performed by: OBSTETRICS & GYNECOLOGY

## 2017-02-09 PROCEDURE — 88300 SURGICAL PATH GROSS: CPT | Performed by: OBSTETRICS & GYNECOLOGY

## 2017-02-09 PROCEDURE — 85025 COMPLETE CBC W/AUTO DIFF WBC: CPT | Performed by: OBSTETRICS & GYNECOLOGY

## 2017-02-09 PROCEDURE — 65270000029 HC RM PRIVATE

## 2017-02-09 PROCEDURE — 74011000258 HC RX REV CODE- 258: Performed by: OBSTETRICS & GYNECOLOGY

## 2017-02-09 PROCEDURE — 88261 CHROMOSOME ANALYSIS 5: CPT | Performed by: OBSTETRICS & GYNECOLOGY

## 2017-02-09 PROCEDURE — 74011250637 HC RX REV CODE- 250/637: Performed by: OBSTETRICS & GYNECOLOGY

## 2017-02-09 RX ORDER — SODIUM CHLORIDE 0.9 % (FLUSH) 0.9 %
5-10 SYRINGE (ML) INJECTION AS NEEDED
Status: DISCONTINUED | OUTPATIENT
Start: 2017-02-09 | End: 2017-02-09 | Stop reason: SDUPTHER

## 2017-02-09 RX ORDER — KETOROLAC TROMETHAMINE 30 MG/ML
30 INJECTION, SOLUTION INTRAMUSCULAR; INTRAVENOUS ONCE
Status: COMPLETED | OUTPATIENT
Start: 2017-02-09 | End: 2017-02-09

## 2017-02-09 RX ORDER — SODIUM CHLORIDE, SODIUM LACTATE, POTASSIUM CHLORIDE, CALCIUM CHLORIDE 600; 310; 30; 20 MG/100ML; MG/100ML; MG/100ML; MG/100ML
125 INJECTION, SOLUTION INTRAVENOUS CONTINUOUS
Status: DISCONTINUED | OUTPATIENT
Start: 2017-02-09 | End: 2017-02-09 | Stop reason: SDUPTHER

## 2017-02-09 RX ORDER — KETOROLAC TROMETHAMINE 30 MG/ML
INJECTION, SOLUTION INTRAMUSCULAR; INTRAVENOUS
Status: DISPENSED
Start: 2017-02-09 | End: 2017-02-09

## 2017-02-09 RX ORDER — SODIUM CHLORIDE 0.9 % (FLUSH) 0.9 %
5-10 SYRINGE (ML) INJECTION EVERY 8 HOURS
Status: DISCONTINUED | OUTPATIENT
Start: 2017-02-09 | End: 2017-02-09 | Stop reason: SDUPTHER

## 2017-02-09 RX ADMIN — ZOLPIDEM TARTRATE 5 MG: 5 TABLET, FILM COATED ORAL at 04:02

## 2017-02-09 RX ADMIN — SODIUM CHLORIDE, SODIUM LACTATE, POTASSIUM CHLORIDE, AND CALCIUM CHLORIDE 125 ML/HR: 600; 310; 30; 20 INJECTION, SOLUTION INTRAVENOUS at 09:48

## 2017-02-09 RX ADMIN — KETOROLAC TROMETHAMINE 30 MG: 30 INJECTION, SOLUTION INTRAMUSCULAR at 01:15

## 2017-02-09 RX ADMIN — CEFAZOLIN SODIUM 1 G: 1 INJECTION, POWDER, FOR SOLUTION INTRAMUSCULAR; INTRAVENOUS at 09:46

## 2017-02-09 RX ADMIN — CEFAZOLIN SODIUM 1 G: 1 INJECTION, POWDER, FOR SOLUTION INTRAMUSCULAR; INTRAVENOUS at 01:13

## 2017-02-09 RX ADMIN — Medication 2500 MILLI-UNITS/HR: at 01:13

## 2017-02-09 RX ADMIN — ZOLPIDEM TARTRATE 5 MG: 5 TABLET, FILM COATED ORAL at 22:22

## 2017-02-09 RX ADMIN — CEFAZOLIN SODIUM 1 G: 1 INJECTION, POWDER, FOR SOLUTION INTRAMUSCULAR; INTRAVENOUS at 17:50

## 2017-02-09 NOTE — PROGRESS NOTES
17 2:04 PM  EMR reviewed, patient resting at this time. CM spoke with Yoon RN; patient concerned about how to talk to her daughter, 9years old, about loss. Spiritual Services provided book, The Healing Book, to assist with this, in addition to literature regarding loss and how to help children cope.  loss group information will be included in patient's discharge packet.    ADINA Hernandez

## 2017-02-09 NOTE — H&P
History & Physical    Name: Panchito Shi MRN: [id number]  SSN: [id number]    YOB: 1985  Age: 36 y.o. Sex: female        Subjective:     Estimated Date of Delivery: 17  OB History    Para Term  AB SAB TAB Ectopic Multiple Living   2 1 1       1      # Outcome Date GA Lbr Shalom/2nd Weight Sex Delivery Anes PTL Lv   2 Current            1 Term 09/15/09 40w2d  3.019 kg F VAGINAL DELI EPI N Y      Complications: Meconium in amniotic fluid          Ms. Blaine Arango is a  at 37w2d that was sent to labor and delivery because of no fetal movement and a non reactive NST. Prior to the patient's arrival I was informed by Dr. Melina Mcnamara that the patient presented for her routine OB appointment with complaint of no fetal movement for two days. I was also informed that an NST was performed in the office that showed minimal variability and no accelerations. The patient was therefore advised to go to labor and delivery for prolonged fetal monitoring. Upon arrival to labor and delivery the fetal tracing was noted to have a baseline of 135 minimal variability and in iv fluid bolus was ordered. Thirteen minutes after arrival to the labor and delivery a fetal heart rate deceleration occurred. I entered the patient's room as the fetal heart deceleration was occurring. I was informed by the patient's nurse that an bedside sono was performed and that fetal heart tones could not be obtained. I introduced myself to the patient and informed the patient that she would need an emergency . The patient was then taken to the OR for an emergency . Prenatal course was normal. Please see prenatal records for details. Past Medical History   Diagnosis Date    Pap smear for cervical cancer screening 18 months ago     No past surgical history on file. Social History     Occupational History    Not on file.      Social History Main Topics    Smoking status: Never Smoker    Smokeless tobacco: Never Used    Alcohol use No      Comment: occasional    Drug use: Not on file    Sexual activity: Yes     Partners: Male     Birth control/ protection: None     Family History   Problem Relation Age of Onset    No Known Problems Mother     No Known Problems Father        No Known Allergies  Prior to Admission medications    Medication Sig Start Date End Date Taking? Authorizing Provider   raNITIdine (ZANTAC) 150 mg tablet Take 1 Tab by mouth two (2) times a day. 10/25/16   Oswaldo Stover MD   PNV no.27-mcrg-orymq acid-dha -602 mg-mcg-mg cmpk Take  by mouth. Historical Provider        Review of Systems: Review of systems not obtained due to patient factors. Objective:     Vitals:  Vitals:    17 1940 17 1950 17   BP: 142/88 146/73 (!) 136/95 133/89   Pulse: 96 87 80 94   Resp: 16 12 11 13   Temp:    98.8 °F (37.1 °C)   SpO2: 100% 100% 100% 100%        Physical Exam:  Patient with distress.   Sve: 2-3/60 vtx (performed by Dr. Alexandra Garner in the office)  Membranes:  Intact  Fetal Heart Rate: 135 minimal variability, no accelerations, fetal bradycardia, cat 3  [place]: contractions q 1-2 minutes    Prenatal Labs:   O pos  Rub imm  HIV neg  Hep B neg  RPR NR  GC neg  Chlamydia neg  1 hr gtt- 132  3 hr gtt- wnl  MSAFP- neg  20 wk anatomy ultrasound- wnl    Assessment/Plan:   Ass/Plan: 35 yo  at 37 2/7 wks with fetal bradycardia, cat 3 fetal tracing  -emergency   -anesthesia and NICU notitifed    Signed By:  Alicja Martin MD     2017

## 2017-02-09 NOTE — PROGRESS NOTES
Post-Operative Day Number 1 Progress Note    Patient doing well post-op day 1 from  delivery without significant complaints. Pain well controlled. Lochia scant. Tolerating regular diet. Not ambulating yet. Voiding without difficulty, Rider still in place. Passing flatus. Has not had a bowel movement. Post-Op Hgb: 11.4     The pt states that emotionally she is doing OK. But she became tearful when started to discuss what happened. Her main concern is that she is unsure how to talk to her 10 yo daughter about her son's demise. We discussed and the pt knows that there is a  who can help her with the conversation with her daughter. Vitals:    Patient Vitals for the past 8 hrs:   BP Pulse Height Weight   17 0734 118/67 64 - -   17 0347 - - 5' 2\" (1.575 m) 74.4 kg (164 lb)     Temp (24hrs), Av.4 °F (36.9 °C), Min:97.8 °F (36.6 °C), Max:98.8 °F (37.1 °C)      Vital signs stable, afebrile. Exam:   Patient without distress, the pt became tearful after starting talking that                CTAB, no w/r/r/c    RRR, + S1 and S2, no m/r/g    Abdomen soft, fundus firm and at the umbilicus, non tender                Incision covered with clean bandage, appropriately tender               Lower extremities negative for swelling, no cords or tenderness, SCDs in place    Lab/Data Review:  Recent Results (from the past 12 hour(s))   CBC WITH AUTOMATED DIFF    Collection Time: 17  4:04 AM   Result Value Ref Range    WBC 16.3 (H) 3.6 - 11.0 K/uL    RBC 3.83 3.80 - 5.20 M/uL    HGB 11.4 (L) 11.5 - 16.0 g/dL    HCT 34.3 (L) 35.0 - 47.0 %    MCV 89.6 80.0 - 99.0 FL    MCH 29.8 26.0 - 34.0 PG    MCHC 33.2 30.0 - 36.5 g/dL    RDW 13.7 11.5 - 14.5 %    PLATELET 367 423 - 125 K/uL    NEUTROPHILS 84 (H) 32 - 75 %    LYMPHOCYTES 10 (L) 12 - 49 %    MONOCYTES 6 5 - 13 %    EOSINOPHILS 0 0 - 7 %    BASOPHILS 0 0 - 1 %    ABS. NEUTROPHILS 13.7 (H) 1.8 - 8.0 K/UL    ABS. LYMPHOCYTES 1.6 0.8 - 3.5 K/UL    ABS. MONOCYTES 1.0 0.0 - 1.0 K/UL    ABS. EOSINOPHILS 0.0 0.0 - 0.4 K/UL    ABS. BASOPHILS 0.0 0.0 - 0.1 K/UL         Assessment and Plan:    Aliya Mcpherson is a 36 y.o.  s/p emergent LTCS at 37w2d for fetal bradycardia, with fetal demise. Patient appears to be having uncomplicated post- course but needs good emotional support. 1. Continue routine post-op care with pain control. 2. Will remove Rider later today. 3.   The pt required good emotional support during the hospital stay. The pt is notified that she can ask for the  at any time. 4. Anticipate d/c on 2017. 5. Zolpidem 5mg at bed time as needed for sleep. 6. Will continue Ancef every 8 hours for today as the pt had emergent CS and has not received pre-op antibiotic prophylaxis.       Patient will be discussed with Dr. Delgado Kay MD  Family Medicine Resident

## 2017-02-09 NOTE — PROGRESS NOTES
Pt verbalizes desires to see the baby but is unable bring herself to see baby. Allowed time to think about it.   Declines to see baby and requests baby to be taken for autopsy

## 2017-02-09 NOTE — L&D DELIVERY NOTE
Delivery Summary    Patient: Cristiano Kirk MRN: [id number]  SSN: [id number]    YOB: 1985  Age: 36 y.o. Sex: female        Labor Events:    Labor:      Rupture Date:      Rupture Time:      Rupture Type:      Amniotic Fluid Volume:       Amniotic Fluid Description:  Amniotic fluid Odor:            Induction:           Induction Date:        Induction Time:       Indications for Induction:       Augmentation:      Augmentation Date:      Augmentation Time:      Indications for Augmentation:      Events:        Cervical Ripening:            Rupture Identifier:       Labor complications: Additional complications:         Delivery Events:  Estimated Blood Loss (ml):        Information for the patient's :  Catalina Mace [[id number]]     Delivery Summary - Baby    Delivery Date: 2017  Delivery Time: 5:57 PM  Delivery Type: , Low Transverse    Section Delivery:     Sex:  male     Gestational Age: 42w2d  Delivery Clinician:  Aixa To   Living?: Fetal Demise  Delivery Location: OR           APGARS  One minute Five minutes Ten minutes   Skin color: 0   0   0     Heart rate: 0   0   0     Grimace: 0   0   0     Muscle tone: 0   0   0     Breathin   0   0     Totals: 0   0   0        Presentation: Vertex    Position:   Occiput    Resuscitation Method:    attempted intubation x 3   Meconium Stained: Thick      Cord Vessels: 3 Vessels      Cord Events:    Cord Blood Sent?:  No    Blood Gases Sent?:  Yes    Placenta:  Date/Time:   5:58 PM  Removal: Manual Removal      Appearance: Intact      Measurements:  Birth Weight: 3.875 kg      Birth Length: 49.5 cm      Head Circumference:        Chest Circumference:       Abdominal Girth:       Other Providers:           Group Beta Strep:   Lab Results   Component Value Date/Time    Kunal, External negative 2017        Cord Blood Results:  Information for the patient's :  Samina Painter [place] High, Male [[id number]]   No results found for: Hayden Loh, PCTDIG, BILI, ABORHEXT, [address]    Information for the patient's :  Audi Cueva, Male [[id number]]     Lab Results   Component Value Date/Time    SITE OTHER 2017 06:31 PM     Information for the patient's :  Audi Cueva, Male [[id number]]   No results found for: EPHV, PCO2V, PO2V, HCO3V, O2STV, EBDV  Additional Comments: Emergency  performed for fetal bradycardia. Fetus delivered with apgars 0,0,0 despite the resuscitative efforts of the NICU team.   There was thick meconium and a loose nuchal cord x 2. The infant's appearance was consistent with fetal hydrops. Please see H+P and operative report for more details.

## 2017-02-09 NOTE — PROGRESS NOTES
pferi care rendered, pad changed, henry was not draining, and now draining. Up to soft chair with assistance. Family/visitors at bedside.

## 2017-02-09 NOTE — ANESTHESIA POSTPROCEDURE EVALUATION
Post-Anesthesia Evaluation and Assessment    Patient: Hernandez Brothers MRN: [id number]  SSN: [id number]    YOB: 1985  Age: 36 y.o. Sex: female       Cardiovascular Function/Vital Signs  Visit Vitals    /80 (BP 1 Location: Right arm, BP Patient Position: At rest)    Pulse 94    Temp 37 °C (98.6 °F)    Resp 16    SpO2 99%       Patient is status post general anesthesia for Procedure(s):   SECTION. Nausea/Vomiting: None    Postoperative hydration reviewed and adequate. Pain:  Pain Scale 1: Numeric (0 - 10) (17)  Pain Intensity 1: 0 (17)   Managed    Neurological Status:   Neuro (WDL): Within Defined Limits (17)  Neuro  LUE Motor Response: Purposeful (17)  LLE Motor Response: Purposeful (17)  RUE Motor Response: Purposeful (17)  RLE Motor Response: Purposeful (17)   At baseline    Mental Status and Level of Consciousness: Arousable    Pulmonary Status:   O2 Device: Room air (17)   Adequate oxygenation and airway patent    Complications related to anesthesia: None    Post-anesthesia assessment completed.  No concerns    Signed By: Valentin Carrero MD     2017

## 2017-02-09 NOTE — PROGRESS NOTES
I met with the patient while she was in the PACU. Dr. Mohamud Machado assisted with translation. The patient was tearful as she had already been informed about her infant's demise from the pediatrician. I gave the patient my condolences. I informed the patient that an emergency  was performed because her baby's heart rate went down and did not come back up. The patient was informed that upon delivery the baby did not have cardiac activity and was not breathing. She was informed that the NICU team worked very hard in trying to resuscitate the baby but were not able to do so. She was informed that the infant had two loose nuchal cords. Also informed that there was thick meconium. Informed that the infant appeared hydropic/edematous in appearance with a very distended abdomen. The patient started to blame herself and I reassured the patient that the outcome was not her fault. The patient was informed that an autopsy could be performed on the baby if she desired and the patient stated that she did want an autopsy. I informed the patient that I would be in the hospital all evening and would be available upon her request.  Dr. Otto Tamez and Dr. Alexandra Garner were also present to help comfort the patient.

## 2017-02-09 NOTE — PROGRESS NOTES
Annabel Jameson is attempting follow up with pt at the time, per suggestion pt might want to talk with a  re: how to talk with her 9year old daughter about her recent loss. Pt is not up for a visit at the time.  discussed with pt's RN & provided material from the [facility] bereavement terence re: how to talk with children about death & developmentally, age informed conversations with children after the loss of another child. Additionally,  provided a Devin Avalos illustrated workbook meant to engage family and children in processing grief and loss of a loved one for pt to use as a tool of tending to loss and grief with her daughter, through art. Chaplains will continue to follow as needed. Rev.  [id number] Estefany Castro M.Div, M.S, Chris 606 available at [address] [place] [phone])

## 2017-02-09 NOTE — PROGRESS NOTES
80- Pt arrives from MD office c/o decreased fetal movement. States baby has not been moving [employer]" for the past week. States felt 4 movements in an hour on 2/4/17. Went to regular appt today and was told to come to L&D for monitoring. Placed on EFM x 2.  1733- Resident sitting at desk and notified of pt arrival.   1736- IV placed and fluid bolus started. Resident at bedside. 18- Dr Snow Garcia notified of patient arrival and strip. States will be in to see patient. 1745- Decrease in FHR noted. Pt asking to get up to bathroom. Instructed pt to remain in bed on monitor. Pt turned to left side and placed on NRB. 1747- Additional nurses at bedside. Pt turned side to side attempting to find FHR. Dr Snow Garcia called again as now unable to find FHR. US to bedside, not performed. Waiting for Dr Snow Garcia. 1750- Taken to OR for STAT section. 1751- In OR at this time. Dr Jack Cochran in [facility] [address] for intubation. [address] in [facility] [address] as well.   8895- Dr Snow Garcia in [facility] [address], Dr Subha Silver in OR to assist. Attempted to locate FHR but unable. Betadine splash at this time. 1756- Pt now sedated and incision made. 5- Baby out and handed to NICU staff. 1822- Closed with staples at this time. 1825- Xray at bedside for portable xray for counts. [address]- Notified by xray that no foreign body noted. 1850- Pt cleaned and transferred to post partum bed. 1852- Out of OR and to PACU at this time. 1905- Bedside and Verbal shift change report given to Cat (oncoming nurse) by Burke Almendarez (offgoing nurse). Report included the following information SBAR.   2004- Left message for [employer] to call back.

## 2017-02-09 NOTE — PROGRESS NOTES
An 2 cm piece of skin from the baby's left heel was excised and placed in an specimen container with normal saline for genetic testing.

## 2017-02-09 NOTE — PERIOP NOTES
TRANSFER - OUT REPORT:    Verbal report given to michelle park(name) on Aixa Melton  being transferred to L and D(unit) for routine post - op       Report consisted of patients Situation, Background, Assessment and   Recommendations(SBAR). Information from the following report(s) SBAR, Procedure Summary, Intake/Output and MAR was reviewed with the receiving nurse. Lines:   Peripheral IV 02/08/17 Right Wrist (Active)   Site Assessment Clean, dry, & intact 2/8/2017  5:33 PM   Phlebitis Assessment 0 2/8/2017  5:33 PM   Infiltration Assessment 0 2/8/2017  5:33 PM   Dressing Status Clean, dry, & intact 2/8/2017  5:33 PM   Dressing Type Tape;Transparent 2/8/2017  5:33 PM   Hub Color/Line Status Pink 2/8/2017  5:33 PM   Action Taken Blood drawn 2/8/2017  5:33 PM        Opportunity for questions and clarification was provided.       Patient transported with:   Registered Nurse

## 2017-02-09 NOTE — OP NOTES
Date of Surgery: 2017    Preoperative Diagnosis:  at 37 2/7wks with decreased fetal movement, fetal bradycardia  Postoperative Diagnosis: Emergency , term male fetal demise, thick meconium, nuchal cord x 2, fetal hydrops  Procedure: Procedure(s):  Emergent Primary LSTCS      Surgeon(s) and Role:     * Doreen Thomas MD - Primary   Assistant: Dr. Lupe Gomes    Procedure Detail:     The patient was taken to the operating room where general endotracheal anesthesia was administered.  The patient was prepped and draped.    Pfannenstiel skin incision was made with the scalpel and carried down to the underlying fascia using the scapel. The fascial incision was digitally extended laterally. The rectus muscle was manually dissected off of the fascia. The peritoneum was entered bluntly and the peritoneal opening was manually stretched.  The bladder blade was then inserted.  The scapel was used to make an low transverse incision just above the bladder reflection. The hysterotomy was digitally extended superior and inferiorly. The babys was head was elevated into the uterine incision. Fundal pressure was applied and the infant's head was delivered. There were two loose nuchal cords that were manually reduced. Fundal pressure was continued and the infants's torso was delivered atraumatically.   The cord was clamped and cut and the baby was handed off to the waiting NICU team.  A segment of the umbilical cord was clamped for collection of an cord blood gas. The placenta was  delivered by manual extraction. The uterus was cleared of all clots and debris. The uterus was exteriorized.  The uterine incision was closed in two layers. The first layer with running locked layer of 0 Monocryl.  The second layer was an imbricating layer of 0 Monocryl with good hemostasis assured.  The uterus was returned to the patient's pelvis.  The paracolic gutters were cleared of blood and clots and the patient's pelvis was thoroughly irrigated with warm normal saline. Good hemostasis was reassured throughout. The peritoneum was reaproximated using 2-0 Vicryl.   The rectus bellies were reapproximated using interrupted sutures if 2-0 vicryl. The fascia was closed with 0 Vicryl in a running fashion. The subcutaneous layer was thoroughly irrigated.  Good hemostasis was assured.  The subcutaneous layer was reaproximated with a running suture of 2-0 plain.  The skin was closed with staples. Cytotec 800 micrograms was administered rectally at the end of the case for uterine atony prophylaxis. The patient tolerated the procedure well. An instrument and lap count was not done prior to surgery. An abdominal x-ray was performed that did not show any intraabdominal instruments or laps. The patient was extubated and taken to the recovery room in stable condition. Estimated Blood Loss: 600    Findings: Term male fetal demise delivered in vertex presentation.  The fetus was dusky in appearance and had no tone and was not crying upon delivery. The infant was noted to have an hydropic appearance. The infant's abdomen was very distended and taut and his skin appeared edematous. There two loose nuchal cords that were easily reduced. There was thick meconium. The placenta grossly appeared normal in appearance.   Normal appearing uterus and adnexa.     Specimens:    ID    Type    Source    Tests    Collected by    Time    Destination                                  Cord Blood Results: 6.86/123/21/-15.4  Information for the patient's :           Prenatal Labs:                                                                                                               [facility]               Signed By: Tez Day MD           2017

## 2017-02-09 NOTE — PROGRESS NOTES
Spiritual Care Assessment/Progress Notes    Mao Rogers [id number]  [id number]    1985  36 y.o.  female    Patient Telephone Number: [phone] (home)   Nondenominational Affiliation: Episcopalian   Language: English   Extended Emergency Contact Information  Primary Emergency Contact: [address] Phone: [phone]  Relation: Friend  [facility]: [phone]  Relation: Friend   Patient Active Problem List    Diagnosis Date Noted    Vaginal discharge during pregnancy in third trimester 12/07/2016    Cough 10/25/2016    Gastroesophageal reflux disease without esophagitis 10/25/2016    Prenatal care, subsequent pregnancy in third trimester 07/26/2016        Date: 2/8/2017       Level of Nondenominational/Spiritual Activity:  [x]         Involved in brenna tradition/spiritual practice    []         Not involved in brenna tradition/spiritual practice  [x]         Spiritually oriented    []         Claims no spiritual orientation    []         seeking spiritual identity  []         Feels alienated from Latter-day practice/tradition  []         Feels angry about Latter-day practice/tradition  [x]         Spirituality/Latter-day tradition is a resource for coping at this time.   []         Not able to assess due to medical condition    Services Provided Today:  []         crisis intervention    []         reading Scriptures  [x]         spiritual assessment    [x]         prayer  []         empathic listening/emotional support  []         rites and rituals (cite in comments)  []         life review     []         Latter-day support  []         theological development   []         advocacy  []         ethical dialog     []         blessing  [x]         bereavement support    [x]         support to family  []         anticipatory grief support   []         help with AMD  []         spiritual guidance    []         meditation      Spiritual Care Needs  [x]         Emotional Support  []         Spiritual/Mormonism Care  []         Loss/Adjustment  []         Advocacy/Referral                /Ethics  []         No needs expressed at               this time  []         Other: (note in               comments)  Spiritual Care Plan  []         Follow up visits with               pt/family  []         Provide materials  []         Schedule sacraments  []         Contact Community               Clergy  [x]         Follow up as needed  []         Other: (note in               comments)     Comments: Paged back for patient on L&D after anesthesia wore off for prayer for fetal demise and family. Rosemarie Vallecillo M.S., M.Div.   [facility] ([id number])

## 2017-02-09 NOTE — PROGRESS NOTES
After the surgery I met with the patient's mother along with a . The mother was informed that an emergency  was performed because of the baby's heart rate decreased and did not come back up. Informed that because of the emergent nature of the surgery her daughter had to receive general anesthesia. She was informed that the baby was delivered in an expeditious fashion but unfortunately was born without cardiac and respiratory activity. She was informed that the NICU team worked very hard to resuscitate that baby but despite their efforts were unable to do so. She was informed that the baby was born  with two loose nuchal cords. The mother was also informed that their was thick meconium. Also informed that the baby had an hydropic appearance in which his abdomen was distended and his skin was edematous. In addition she was made aware that the NICU team had difficulty attempting intubation. I gave the patient's mother my condolences and answered all of her questions.

## 2017-02-09 NOTE — PROGRESS NOTES
I spoke with the patient with the assistance of an . When asked about her pain control the patient c/o menstrual type cramping. She also states that she is very hungry and desires to eat. She feels that emotionally she is okay. She still does not want to see her baby at this point. I inquired about genetic testing and the patient states that she does want this. She also desires autopsy and to have an hospital burial.  She saw the hospital  earlier this evening. She is unsure of how to go about speaking with her 9year old daughter about her son's demise. The patient was made aware that when her daughter visits tomorrow the hospital  come by to assist with talking to her daughter. Patient informed that I will order toradol to help with her cramping and that in addition to sips of water she can have some saltines.

## 2017-02-09 NOTE — PROGRESS NOTES
Spiritual Care Assessment/Progress Notes    Malu Magaña [id number]  [id number]    1985  36 y.o.  female    Patient Telephone Number: [phone] (home)   Yarsanism Affiliation: Episcopalian   Language: English   Extended Emergency Contact Information  Primary Emergency Contact: [address] Phone: [phone]  Relation: Friend  Secondary Emergency Contact: Ricardo Candelario [place] [address] Phone: [phone]  Relation: Friend   Patient Active Problem List    Diagnosis Date Noted    Vaginal discharge during pregnancy in third trimester 12/07/2016    Cough 10/25/2016    Gastroesophageal reflux disease without esophagitis 10/25/2016    Prenatal care, subsequent pregnancy in third trimester 07/26/2016        Date: 2/8/2017       Level of Yarsanism/Spiritual Activity:  []         Involved in brenna tradition/spiritual practice    []         Not involved in brenna tradition/spiritual practice  []         Spiritually oriented    []         Claims no spiritual orientation    []         seeking spiritual identity  []         Feels alienated from Presybeterian practice/tradition  []         Feels angry about Presybeterian practice/tradition  []         Spirituality/Presybeterian tradition  a resource for coping at this time.   [x]         Not able to assess due to medical condition    Services Provided Today:  []         crisis intervention    []         reading Scriptures  []         spiritual assessment    []         prayer  []         empathic listening/emotional support  []         rites and rituals (cite in comments)  []         life review     []         Presybeterian support  []         theological development   []         advocacy  []         ethical dialog     []         blessing  []         bereavement support    []         support to family  []         anticipatory grief support   []         help with AMD  []         spiritual guidance    []         meditation      Spiritual Care Needs  [x] Emotional Support  []         Spiritual/Taoist Care  []         Loss/Adjustment  []         Advocacy/Referral                /Ethics  []         No needs expressed at               this time  []         Other: (note in               comments)  Spiritual Care Plan  []         Follow up visits with               pt/family  []         Provide materials  []         Schedule sacraments  []         Contact Community               Clergy  []         Follow up as needed  []         Other: (note in               comments)     Comments:  Paged to provide support to grandmother of patient in L&D that had a fetal demise. Patient was in the PACU still coming out of anesthesia at time of visit.  provided emotional support and affirmation of suffering. Advised how to access Chaplains. Chika Ray M.S., M.Div.   [facility] ([id number])

## 2017-02-09 NOTE — PROGRESS NOTES
2010- pt brought back to L/D from PACU on postpartum bed. Received pacu report from Iván Frankel, RN including ventura TRAYLOR and ouput, MAR. Pt tearful and verbalizing that she doesn't wish to see or hold infant at this time, but family members may go and see infant. Pt stating that pain is now under control and she wishes to have pastoral care come and do a prayer for her and the family. RN explained that she would contact pastoral services so that she may talk with them. 1- pastoral care services saloni, nursing supervisor on unit at this time and informed of fetal demise, explained that there are no needs at this time. 2030- Bismark Rank at bedside to offer emotional assistance and prayer for pt and family. Pt verbalized that she doesn't want a blessing for the infant at this time and would just prefer a prayer for comfort and strength. RN and family members remained at bedside for prayer that was offered. Katelyn Rajput explained that if the patient needed emotional support or someone to just sit with her they would be available and she just needs to let her nurse know so they can be contacted. 2100Prateek Norman from [facility] called in regards to organ donation and status of pt and infant. Explained that pt had verbalized that she wanted genetic testing and autopsy on infant but, that she hasn't seen or held the baby. Relayed infant's birth weight 8 lbs 8 oz. Gave him pt's contact number per his request.  Aziza Wolfe 48 from [facility] called again to ask if baby was going to [facility] office, I explained that the baby had no heart rate when it was born and I was unsure. Just relayed again that pt desires autopsy performed by hospital.  2345Rashi Draper from [facility] called again asking if baby will go to [facility] office, I explained again I didn't know and that the mother hasn't seen the baby yet and no definitive decisions had been made.  At this point he asked how the pt was doing and if I could go and ask the pt if she would speak with him about organ donation. I placed him on hold and contacted my charge nurse Mino Vale about the constant calling and repeated questions he had asked that I had answered the best to my knowledge every time I spoke to him. Mino Vale at this point got on the phone and relayed to Simin Cleary that it had only been 6 hours since birth the patient hasn't held or seen the baby and isn't in a good emotional place at this point to discuss this option with him. Mino Vale then asked what the time frame for organ procurement is and Simin Cleary stated 20 hours. Haley requested that Simin Cleary call back in the morning and ask for her allowing us time to approach this subject matter with the patient. Simin Cleary agreed to this and the call ended. Patrick Sheridan from [employer] called again and stated to Mino Vale that the infant isn't a candidate for organ procurement when questioned why this information wasn't relayed earlier he couldn't give an answer. 4900 [facility]- Dr. Som Higgins at bedside discussing genetic testing, autopsy and burial options with pt. Pt agrees to genetic testing, autopsy and would like to have hospital burial through [facility] due to the fact that she cannot afford it. RN witnessed consents signed by Dr. Som Higgins and pt at this time. Pt very tearful at this time asking if \"this was her fault\". RN responded while sitting next to pt at bedside that this wasn't her fault and to give herself time to heal and grieve. RN explained that she may good days and sad days and that she shouldn't hesitate to reach out to others for emotional support and comfort. Pt verbalized understanding, thanked RN and asked if RN would tell infant that the patient loves him, even though she doesn't want to see him or hold him. 0400- pt called to room, pt sobbing and saying that every time she starts to fall asleep she starts crying because her heart hurts from the loss of the baby, RN offered pt Ambien for sleep. Pt agreed to take medication.   9905- called Chuck's  home when I asked if \"[place]\" was there the gentleman explained that Belkis Trevizo was now in charge of fetal demises for the hospital. I left a voice mail message for Diana Espinoza letting him know we had a fetal demise on the unit and the pt would like to use the hospital burial services. I requested that he contact the unit in the morning to find out if the pt was ready to release the infant. 7895-  Verbal shift change report given to Maria Eugenia Elias RN (oncoming nurse) by Lisa Blackman RN (offgoing nurse). Report included the following information SBAR, OR Summary, Procedure Summary, Intake/Output, MAR, Recent Results and Med Rec Status.

## 2017-02-10 PROCEDURE — 74011250637 HC RX REV CODE- 250/637: Performed by: OBSTETRICS & GYNECOLOGY

## 2017-02-10 PROCEDURE — 65270000029 HC RM PRIVATE

## 2017-02-10 RX ORDER — DOCUSATE SODIUM 100 MG/1
100 CAPSULE, LIQUID FILLED ORAL 2 TIMES DAILY
Status: DISCONTINUED | OUTPATIENT
Start: 2017-02-10 | End: 2017-02-11 | Stop reason: HOSPADM

## 2017-02-10 RX ORDER — DOCUSATE SODIUM 100 MG/1
100 CAPSULE, LIQUID FILLED ORAL DAILY
Status: DISCONTINUED | OUTPATIENT
Start: 2017-02-11 | End: 2017-02-10

## 2017-02-10 RX ADMIN — IBUPROFEN 800 MG: 800 TABLET, FILM COATED ORAL at 09:11

## 2017-02-10 RX ADMIN — IBUPROFEN 800 MG: 800 TABLET, FILM COATED ORAL at 00:03

## 2017-02-10 RX ADMIN — OXYCODONE HYDROCHLORIDE AND ACETAMINOPHEN 1 TABLET: 5; 325 TABLET ORAL at 09:11

## 2017-02-10 RX ADMIN — OXYCODONE HYDROCHLORIDE AND ACETAMINOPHEN 1 TABLET: 5; 325 TABLET ORAL at 00:03

## 2017-02-10 RX ADMIN — SIMETHICONE 80 MG: 80 TABLET, CHEWABLE ORAL at 20:54

## 2017-02-10 RX ADMIN — OXYCODONE HYDROCHLORIDE AND ACETAMINOPHEN 1 TABLET: 5; 325 TABLET ORAL at 18:53

## 2017-02-10 RX ADMIN — IBUPROFEN 800 MG: 800 TABLET, FILM COATED ORAL at 18:53

## 2017-02-10 RX ADMIN — ZOLPIDEM TARTRATE 5 MG: 5 TABLET, FILM COATED ORAL at 22:16

## 2017-02-10 RX ADMIN — Medication 1 TABLET: at 09:11

## 2017-02-10 RX ADMIN — OXYCODONE HYDROCHLORIDE AND ACETAMINOPHEN 1 TABLET: 5; 325 TABLET ORAL at 13:55

## 2017-02-10 RX ADMIN — DOCUSATE SODIUM 100 MG: 100 CAPSULE ORAL at 21:51

## 2017-02-10 NOTE — PROGRESS NOTES
0710-  Bedside and Verbal shift change report given to Crescencio Campos RN (oncoming nurse) by Hernan Dillon RN (offgoing nurse). Report included the following information SBAR, OR Summary, Procedure Summary, Intake/Output, MAR, Recent Results and Med Rec Status.

## 2017-02-10 NOTE — PROGRESS NOTES
Medicated for pain, tearful, missing baby. Allowed to verbalize feelings and express emotions. Reassured patient these emotions are normal in the progress of her healing. Patient calmer. Discussed memory box. Verbalizes does desire the memory box and requests this nurse to present to her at the end of my shift.

## 2017-02-10 NOTE — PROGRESS NOTES
Responded to request for  to L & D. Miss Steve orlando was gathered in her room including her young daughter Heriberto Moreno. Heriberto Moreno shared her baby brother had gone to the [place]. We talked Kaye Knowles' death and about God caring for her brother Kaye Knowles. We shared about the love that still exists between Kaye Knowles and Heriberto Moreno as well as with the entire family. Heriberto Moreno became tearful. We talked that it was ok to cry and grieve her brother Kaye Knowles. Sharing tears together and talking about Kaye Knowles is ok. Heriberto Moreno held my hand as we prayed together. Provided assurance of prayers.   Visited by: Suresh Joy [address] [facility] Radha Elder ([id number])

## 2017-02-10 NOTE — PROGRESS NOTES
Post-Operative Day Number 2 Progress Note    Patient doing well post-op day 2 from  delivery without significant complaints. Pain well controlled. Lochia minimal.  Tolerating regular diet. Voiding without difficulty. (+) flatus. (-) BM. Able to walk and move without difficulties. She ambulated across hallways and bathroom without difficulties. Patient and family members, including 9year old daughter had a family reunion with the  yesterday, where the loss of the baby was discussed. Vitals:    Patient Vitals for the past 8 hrs:   BP Temp Pulse   02/10/17 0733 110/69 98.1 °F (36.7 °C) 85     Temp (24hrs), Av.6 °F (37 °C), Min:97.9 °F (36.6 °C), Max:99.8 °F (37.7 °C)      Vital signs stable, afebrile. Exam:   Patient without distress               CTAB, no w/r/r/c    RRR, + S1 and S2, no m/r/g    Abdomen soft, fundus firm and below the umbilicus, non tender                 Incision c/d/i, appropriately tender               Lower extremities negative for swelling, no cords or tenderness    Lab/Data Review:  No results found for this or any previous visit (from the past 12 hour(s)). Assessment and Plan:    Desean Flores is a 36 y.o.  s/p emergent LTCS at 37w2d for fetal bradycardia, with fetal demise. Patient appears to be having uncomplicated post- course, still needs good emotional support. 1. Continue routine post-op care and maternal education. 2. Plan discharge tomorrow if no problems occur.   3. The pt will need close follow up with her family physician    Patient discussed with Dr. Tiara Emerson MD  Family Medicine Resident

## 2017-02-11 VITALS
WEIGHT: 164 LBS | OXYGEN SATURATION: 99 % | HEIGHT: 62 IN | RESPIRATION RATE: 16 BRPM | TEMPERATURE: 97.6 F | SYSTOLIC BLOOD PRESSURE: 123 MMHG | DIASTOLIC BLOOD PRESSURE: 71 MMHG | BODY MASS INDEX: 30.18 KG/M2 | HEART RATE: 86 BPM

## 2017-02-11 PROCEDURE — 74011250637 HC RX REV CODE- 250/637: Performed by: OBSTETRICS & GYNECOLOGY

## 2017-02-11 RX ORDER — DOCUSATE SODIUM 100 MG/1
100 CAPSULE, LIQUID FILLED ORAL 2 TIMES DAILY
Qty: 60 CAP | Refills: 2 | Status: SHIPPED | OUTPATIENT
Start: 2017-02-11 | End: 2017-02-11

## 2017-02-11 RX ORDER — OXYCODONE AND ACETAMINOPHEN 5; 325 MG/1; MG/1
1 TABLET ORAL
Qty: 20 TAB | Refills: 0 | Status: SHIPPED | OUTPATIENT
Start: 2017-02-11 | End: 2017-03-07

## 2017-02-11 RX ORDER — DOCUSATE SODIUM 100 MG/1
100 CAPSULE, LIQUID FILLED ORAL 2 TIMES DAILY
Qty: 60 CAP | Refills: 2 | Status: SHIPPED | OUTPATIENT
Start: 2017-02-11 | End: 2017-03-22

## 2017-02-11 RX ORDER — ZOLPIDEM TARTRATE 5 MG/1
5 TABLET ORAL
Qty: 15 TAB | Refills: 0 | Status: SHIPPED | OUTPATIENT
Start: 2017-02-11 | End: 2017-03-22

## 2017-02-11 RX ORDER — OXYCODONE AND ACETAMINOPHEN 5; 325 MG/1; MG/1
1 TABLET ORAL
Qty: 20 TAB | Refills: 0 | Status: SHIPPED | OUTPATIENT
Start: 2017-02-11 | End: 2017-02-11

## 2017-02-11 RX ORDER — ZOLPIDEM TARTRATE 5 MG/1
5 TABLET ORAL
Qty: 15 TAB | Refills: 0 | Status: SHIPPED | OUTPATIENT
Start: 2017-02-11 | End: 2017-02-11

## 2017-02-11 RX ORDER — IBUPROFEN 800 MG/1
800 TABLET ORAL
Qty: 90 TAB | Refills: 0 | Status: SHIPPED | OUTPATIENT
Start: 2017-02-11 | End: 2017-03-22

## 2017-02-11 RX ORDER — IBUPROFEN 800 MG/1
800 TABLET ORAL
Qty: 90 TAB | Refills: 0 | Status: SHIPPED | OUTPATIENT
Start: 2017-02-11 | End: 2017-02-11

## 2017-02-11 RX ADMIN — OXYCODONE HYDROCHLORIDE AND ACETAMINOPHEN 1 TABLET: 5; 325 TABLET ORAL at 06:06

## 2017-02-11 RX ADMIN — OXYCODONE HYDROCHLORIDE AND ACETAMINOPHEN 1 TABLET: 5; 325 TABLET ORAL at 01:13

## 2017-02-11 RX ADMIN — Medication 1 TABLET: at 11:32

## 2017-02-11 RX ADMIN — DOCUSATE SODIUM 100 MG: 100 CAPSULE ORAL at 11:32

## 2017-02-11 RX ADMIN — OXYCODONE HYDROCHLORIDE AND ACETAMINOPHEN 1 TABLET: 5; 325 TABLET ORAL at 11:30

## 2017-02-11 RX ADMIN — IBUPROFEN 800 MG: 800 TABLET, FILM COATED ORAL at 06:07

## 2017-02-11 NOTE — DISCHARGE SUMMARY
Obstetrical Discharge Summary     Name: Rafael Fontanez MRN: [id number]  SSN: [id number]    YOB: 1985  Age: 36 y.o. Sex: female      Admit Date: 2017    Discharge Date: 2017     Admitting Physician: Bailey Turcios MD     Attending Physician:  No att. providers found     Admission Diagnoses: General  recovered in PACU  pregnancy  Decreased fetal movement determined by examination, third trimester, fetus 1    Discharge Diagnoses:   Information for the patient's :  Willard Phillip, Male [[id number]]   Delivery of a 8 lb 8.7 oz (3.875 kg) male infant via , Low Transverse on 2017 at 5:57 PM  by . Apgars were 0 and 0. Additional Diagnoses:   Hospital Problems  Date Reviewed: 2017          Codes Class Noted POA    Decreased fetal movement determined by examination ICD-10-CM: O36.8190  ICD-9-CM: 655.70  2017 Unknown             Lab Results   Component Value Date/Time    Rubella, External Immune 2016    GrBStrep, External negative 2017       Immunization(s):   Immunization History   Administered Date(s) Administered    Influenza Vaccine (Quad) PF 10/25/2016    Tdap 2016        Hospital Course:   Rafael Fontanez is a 36 y.o.  s/p emergent LTCS at 37w2d for fetal bradycardia, resultant in fetal demise. Patient had an uncomplicated post- course, but she will still need good emotional support in the OP setting. Pain well controlled. Lochia minimal. Tolerating regular diet. Voiding without difficulty. (+) flatus. (-) BM. Able to walk and move without difficulties. Patient was [facility] on [facility] for routine OB Prenatal Visit, the patient reported decreased fetal movements for the past several days, and decreased FHT were recorded. Dr. Aaron Sands sent the patient to L&D for prolonged monitoring and plans for IOL if no improvement.  At the L&D, the baby had poor HR and low variability, for the patient was taken to the OR for an emergent . Term male fetal demise delivered in vertex presentation. The fetus was dusky in appearance and had no tone and was not crying upon delivery. The infant was noted to have an hydropic appearance. The infant's abdomen was very distended and taut and his skin appeared edematous. There two loose nuchal cords that were easily reduced. There was thick meconium. The placenta grossly appeared normal in appearance. Normal appearing uterus and adnexa. At discharge, the patient is medically stable and recovering well from the surgery. Needs to follow up with PCP in 1 weeks for s/p . Condition at Discharge:  Stable    Disposition:  Home. Follow up with [facility] in 1 weeks for s/p . Patient Instructions:   Current Discharge Medication List      CONTINUE these medications which have NOT CHANGED    Details   raNITIdine (ZANTAC) 150 mg tablet Take 1 Tab by mouth two (2) times a day. Qty: 60 Tab, Refills: 1      PNV no.24-iron-folic acid-dha -152 mg-mcg-mg cmpk Take  by mouth. Reference my discharge instructions. No orders of the defined types were placed in this encounter.        Signed By:  Inga Shah MD    Family Medicine Resident

## 2017-02-11 NOTE — PROGRESS NOTES
Problem:  Delivery: Postpartum Day 2  Goal: *Participates in infant care  Variance: Off Pathway (add note)  Comments: IUFD  Goal: *Appropriate parent-infant bonding  Variance: Off Pathway (add note)  Comments: IUFD    Comments:   IUFD

## 2017-02-11 NOTE — PROGRESS NOTES
Pt daughter at bedside, requesting for \"el padre\". Pt verbalizes she enjoyed the prayer yesterday.

## 2017-02-11 NOTE — PROGRESS NOTES
2- CASE MANAGEMENT NOTE:  I was asked by the RN to meet with the pt re financial concerns. She stated she had applied for the Care Card in November but neglected to send requested paperwork/verification back in a timely manner. She also stated that someone from [facility] called her yesterday and said they would assist her but never came to her room. I advised her to follow up with [facility] on Monday as they can better assist her with this matter.  Brooke Nickerson, BSW, CM

## 2017-02-11 NOTE — PROGRESS NOTES
I was paged because Ms. Eldridge Libman Lopez's 9year-old daughter, Jozef Archer, requested a visit from the . She had spoken with a  last night as well and requested an additional visit. Several family members were in the room when I arrived, including Jozef Archer. She was seated by her mother on the bed. She seemed to be very happy and was full of smiles and laughter. I engaged in conversation with her about various things and we laughed together quite a bit. Though this was a situation of grief, there was more amy in the room than I expected and Jozef Archer certainly generated a lot of that through her light-hearted and happy disposition. Jozef Archer and I also talked about Ford Heranndez, her baby brother, and what he might be doing right now in [facility]. This was a meaningful exchange and my goal was to give her an opportunity to speak freely about it, which she did. Additional family members then came into the room, at which time it was suggested by family that I say a prayer. I prayed with them, offering a blessing for them as they grieve and giving thanks for all of the love in the room. Jozef Archer gave me a big hug, which was very sweet, and I stepped out in order to allow the new visitors a chance to spend time with the others. They were highly appreciative of the visit. (As a side note, someone from [facility] called during the visit to tell her that a  from her [facility]), would be coming by tomorrow morning). Spiritual Care Services remains available if any additional spiritual care needs arise. Rev. Radha Nguyen M.Div, [facility]

## 2017-02-11 NOTE — DISCHARGE INSTRUCTIONS
Parto por cesárea: Taamr Farah en el [facility] - [  Section: What to Expect at [facility] ]  Menchaca recuperación    Un parto por cesárea, o simplemente cesárea, es charo cirugía mediante la cual se da a luigi a un bebé a través de un allie, llamado incisión, que hace el médico en la parte baja del abdomen y [place]. Es posible que sienta dolor en la parte baja del abdomen y que necesite analgésicos (medicamentos para el dolor) ben 1 o 2 semanas. Puede esperar tener algo de sangrado vaginal ben varias semanas. Es probable que necesite unas 6 semanas para recuperarse completamente. Es importante que se tome las cosas con calma mientras chuyita la incisión. Evite levantar objetos pesados, hacer actividades vigorosas o hacer ejercicios que pudieran esforzar los músculos abdominales mientras se recupera. Pídale a un familiar o amigo que la ayude con las tareas domésticas, la comida y las compras. Esta hoja de [employer] idea general del tiempo que le llevará recuperarse. Sin embargo, cada persona se recupera a un ritmo diferente. Siga los pasos que se mencionan a continuación para recuperarse lo más rápido posible. ¿Cómo puede cuidarse en el [facility]? Actividad  · Descanse cuando se sienta cansada. Dormir lo suficiente la ayudará a recuperarse. · Intente caminar todos los días. Comience caminando un poco más de lo que caminó el día anterior. Poco a poco, aumente la distancia. Caminar mejora el flujo de [place] y [place] a prevenir la neumonía, el estreñimiento y los coágulos de [place]. · Evite las actividades intensas, elana montar en bicicleta, trotar, levantar pesas y hacer ejercicios aeróbicos ben 6 semanas o hasta que menchaca médico lo apruebe. · Hasta que menchaca médico lo apruebe, no levante nada más pesado que menchaca bebé. · No regis abdominales ni ningún otro ejercicio que [employer] músculos del abdomen ben 6 semanas o hasta que menchaca médico lo apruebe.   · Sostenga charo almohada sobre la incisión al toser o respirar profundamente. Mook Kunz apoyo a hobson abdomen y reducirá el dolor. · Puede ducharse elana de costumbre. Seque la incisión con toques suaves de toalla cuando termine. · Tendrá algo de sangrado vaginal. Use toallas sanitarias. No se regis lavados vaginales ni use tampones hasta que el médico se lo permita. · Pregúntele a hobson médico cuándo puede volver a conducir. · Es probable que necesite ausentarse del trabajo por lo menos 6 semanas. [place] dependerá del tipo de trabajo que regis y de cómo se sienta. · Pregúntele a hobson médico cuándo puede tener Ecolab. Alimentación  · Puede continuar con hobson dieta normal. Si tiene [employer], coma alimentos suaves bajos en grasa, elana arroz sin condimentar, debbi a la scott, pan dimitri y yogur. · Radha abundantes líquidos (a menos que hobson médico le indique lo contrario). · Podría notar que no evacua el intestino con regularidad ricky después de la [place]. [place] es común. Trate de evitar el estreñimiento y no hacer esfuerzos cuando evacua el intestino. Solis vez desee alison un suplemento de MiracleCord. Si no ha evacuado el intestino después de un par de días, pregúntele a hobson médico si puede alison un laxante suave. · Si está amamantando, no radha alcohol. Medicamentos  · Hobson médico le dirá si puede volver a alison pilar medicamentos y cuándo puede volver a hacerlo. También le dará indicaciones sobre cualquier medicamento nuevo que deba alison usted. · Si momo medicamentos que previenen la formación de coágulos de [place], elana warfarina (Coumadin), clopidogrel (Plavix) o aspirina, asegúrese de hablar con hobson médico. Él o diomedes le dirá si debe volver a alison estos medicamentos y en qué momento. Asegúrese de que entiende exactamente lo que el médico quiere que regis. · [employer] International analgésicos (medicamentos para el dolor) exactamente elana le fueron indicados. ¨ Si el médico le recetó un analgésico, tómelo según las indicaciones.   ¨ Si no está tomando un analgésico recetado, pregúntele a hobson médico si puede alison bernadette de [employer] First American. · Si le parece que el analgésico le está produciendo malestar estomacal:  ¨ [place] el medicamento después de las comidas (a menos que hobson médico le haya indicado lo contrario). ¨ Pídale al médico un analgésico diferente. · Si hobson médico le recetó antibióticos, tómelos según las indicaciones. No deje de tomarlos por el hecho de sentirse mejor. Debe alison todos los antibióticos hasta terminarlos. Cuidado de la incisión  · Si tiene tiras de cinta [employer]oco[employer] incisión, déjeselas puestas ben charo semana o hasta que se caigan por sí solas. · Lave la quna a diario con [place] tibia y séquela con toques suaves de toalla. No use peróxido de hidrógeno [place]) o alcohol porque pueden retrasar la sanación. Podría cubrir la quan con charo venda de gasa si supura o roza contra la ropa. Cambie la venda todos los días. · Mantenga la quan limpia y seca. Otras instrucciones  · Si amamanta a hobson bebé, cam vez le resulte más cómodo, ben el proceso de sanación, sostener al bebé de [place] en la que no se apoye en hobson abdomen. Intente poner a hobson bebé debajo del brazo, con el cuerpo del lado que lo Will Ruth a amamantar. Sostenga la parte superior del cuerpo de hobson bebé con hobson Edenilson Ort. Con violeta mano usted puede controlar la jaqui de hobson bebé para acercarle la boca a hobson seno. [place] se conoce a veces elana \"posición de balón de fútbol americano\". La atención de seguimiento es charo parte clave de hobson tratamiento y seguridad. Asegúrese de hacer y acudir a todas las citas, y llame a hobson médico si está teniendo problemas. También es charo buena idea saber los resultados de pilar exámenes y mantener charo lista de los medicamentos que momo. ¿Cuándo debe pedir ayuda? Llame al 911 en cualquier momento que considere que necesita atención de [place]. Por ejemplo, llame si:  · Se desmayó (perdió el conocimiento).   · Tiene síntomas de un coágulo de opal en el pulmón (llamado embolia pulmonar). Estos pueden incluir:  ¨ Dolor repentino en el pecho. ¨ Dificultad para respirar. ¨ Toser opal. · Piensa en hacerse daño a sí misma o en lastimar a hobson bebé o a otra persona. Llame a hobson médico ahora mismo o busque atención médica inmediata si:  · Tiene sangrado vaginal intenso. [place] significa que empapa charo toalla sanitaria cada hora ben 2 horas o más. · Se siente mareada o aturdida, o elana si fuera a desmayarse. · Tiene dolor abdominal nuevo o que empeora. · Tiene puntos de sutura flojos o se le abre la incisión. · Tiene síntomas de infección, tales elana:  ¨ Aumento del dolor, la hinchazón, el enrojecimiento o la temperatura. ¨ Vetas rojizas que salen de la incisión. ¨ Pus que supura de la incisión. Judyann Rang. · Tiene síntomas de un coágulo de opal en la pierna (llamado trombosis venosa profunda), tales elana:  ¨ Dolor en la pantorrilla, el muslo, la jorge o detrás de la rodilla. ¨ Enrojecimiento e hinchazón en la pierna o la jorge. Preste especial atención a los cambios en hobson willie y asegúrese de comunicarse con hobson médico si:  · Se siente vel, ansiosa o desesperanzada ben más de unos días. · No mejora elana se esperaba. ¿Dónde puede encontrar más información en inglés? Adiel Safer a http://rui-pj.info/. Dwaine Live Q208 en la búsqueda para aprender más acerca de \"Parto por cesárea: Cameron Lo en el hogar - [  Section: What to Expect at [facility] ]. \"  Revisado: 30 solomon, 2016  Versión del contenido: 11.1  © 6048-6232 Healthwise, Incorporated. Las instrucciones de cuidado fueron adaptadas bajo licencia por [employer] (which disclaims liability or warranty for this information). Si usted tiene [place] afección médica o sobre estas instrucciones, siempre pregunte a hobson profesional de willie. Healthwise, Incorporated niega toda garantía o responsabilidad por hobson uso de esta información.

## 2017-02-11 NOTE — PROGRESS NOTES
Pt expressing concerns with not having health insurance. Case management notified and came to discuss concerns with pt. Pt to contact Patient Advocacy on Monday for guidance. Pt has phone number saved in her cell phone. [phone] Postpartum discharge instructions given and discussed. Pt given contact information for bereavement support. VSS upon discharge, prescriptions given. Pt to follow up this week with Dr Flakito Ivan or sooner if needed. Pt verbalized understanding and had no further questions.

## 2017-02-13 NOTE — PROGRESS NOTES
I discussed pt with L&D nurse as pt called again regarding care card. I contacted pt to insure that she received assistance with filling out the care card and applying for medicaid. Pt told me Salma Vora had called her this morning and will be mailing a medicaid application and a care card application. Pt stated Salma Vora informed her how to fill out both applications.   Albert Haines

## 2017-02-15 ENCOUNTER — ROUTINE PRENATAL (OUTPATIENT)
Dept: FAMILY MEDICINE CLINIC | Age: 32
End: 2017-02-15

## 2017-02-15 VITALS
DIASTOLIC BLOOD PRESSURE: 79 MMHG | SYSTOLIC BLOOD PRESSURE: 117 MMHG | WEIGHT: 147 LBS | OXYGEN SATURATION: 99 % | BODY MASS INDEX: 27.05 KG/M2 | RESPIRATION RATE: 16 BRPM | HEART RATE: 99 BPM | TEMPERATURE: 98.2 F | HEIGHT: 62 IN

## 2017-02-16 NOTE — PROGRESS NOTES
Presents for followup    S/p emergent CD for fetal bradycardia, infant delivered with APGARs = 0, no visible signs of life, markedly enlarged abdomen    Patient reports that she has been doing well; states that she is taking motrin and percocet; also taking colace. States that she is coping; states that her young daughter is also doing well, going to school.     Larry Painter is with her today as she was in the hospital    Pain controlled; eating, voiding, stooling    PE:  General -- awake, alert, cooperative  abd -- soft, ND, NT  Incision -- clean, dry, intact; closed with staples -- removed without difficulty    Assessment:  Incision and psychosocial check up s/p fetal demise at 37 weeks  Autopsy final results still pending    Plan:  followup in one week

## 2017-02-22 ENCOUNTER — ROUTINE PRENATAL (OUTPATIENT)
Dept: FAMILY MEDICINE CLINIC | Age: 32
End: 2017-02-22

## 2017-02-22 ENCOUNTER — TELEPHONE (OUTPATIENT)
Dept: FAMILY MEDICINE CLINIC | Age: 32
End: 2017-02-22

## 2017-02-22 VITALS
BODY MASS INDEX: 27.05 KG/M2 | WEIGHT: 147 LBS | RESPIRATION RATE: 12 BRPM | SYSTOLIC BLOOD PRESSURE: 98 MMHG | HEIGHT: 62 IN | DIASTOLIC BLOOD PRESSURE: 65 MMHG | HEART RATE: 79 BPM | TEMPERATURE: 97.9 F | OXYGEN SATURATION: 97 %

## 2017-02-22 DIAGNOSIS — F43.21 GRIEF AT LOSS OF CHILD: Primary | ICD-10-CM

## 2017-02-22 DIAGNOSIS — Z63.4 GRIEF AT LOSS OF CHILD: Primary | ICD-10-CM

## 2017-02-22 SDOH — SOCIAL STABILITY - SOCIAL INSECURITY: DISSAPEARANCE AND DEATH OF FAMILY MEMBER: Z63.4

## 2017-02-22 NOTE — TELEPHONE ENCOUNTER
Per call from patient, she need a letter to state her child was stillborn.     Call [phone]      thanks

## 2017-03-07 ENCOUNTER — ROUTINE PRENATAL (OUTPATIENT)
Dept: FAMILY MEDICINE CLINIC | Age: 32
End: 2017-03-07

## 2017-03-07 VITALS
SYSTOLIC BLOOD PRESSURE: 121 MMHG | OXYGEN SATURATION: 97 % | BODY MASS INDEX: 25.76 KG/M2 | RESPIRATION RATE: 18 BRPM | HEIGHT: 62 IN | HEART RATE: 80 BPM | TEMPERATURE: 98 F | DIASTOLIC BLOOD PRESSURE: 84 MMHG | WEIGHT: 140 LBS

## 2017-03-07 DIAGNOSIS — R30.9 PAIN WITH URINATION: ICD-10-CM

## 2017-03-07 PROBLEM — N30.01 ACUTE CYSTITIS WITH HEMATURIA: Status: ACTIVE | Noted: 2017-03-07

## 2017-03-07 LAB
BILIRUB UR QL STRIP: NEGATIVE
GLUCOSE UR-MCNC: NEGATIVE MG/DL
KETONES P FAST UR STRIP-MCNC: NEGATIVE MG/DL
PH UR STRIP: 7 [PH] (ref 4.6–8)
PROT UR QL STRIP: NEGATIVE MG/DL
SP GR UR STRIP: 1.02 (ref 1–1.03)
UA UROBILINOGEN AMB POC: NORMAL (ref 0.2–1)
URINALYSIS CLARITY POC: CLEAR
URINALYSIS COLOR POC: YELLOW
URINE BLOOD POC: NORMAL
URINE LEUKOCYTES POC: NORMAL
URINE NITRITES POC: NEGATIVE

## 2017-03-07 RX ORDER — SULFAMETHOXAZOLE AND TRIMETHOPRIM 800; 160 MG/1; MG/1
1 TABLET ORAL 2 TIMES DAILY
Qty: 6 TAB | Refills: 0 | Status: SHIPPED | OUTPATIENT
Start: 2017-03-07 | End: 2017-03-10

## 2017-03-07 NOTE — MR AVS SNAPSHOT
Visit Information Date & Time Provider Department Dept. Phone Encounter #  
 3/7/2017  1:30 PM Stephanie Larkin, [facility] [phone] [id number] Your Appointments 3/21/2017 11:05 AM  
ROUTINE CARE with Stephanie Larkin MD  
[facility] ([address]) Appt Note: f/u for fetal demise [address]  
[phone]  
  
   
 [address] [facility] 99 [id number] Upcoming Health Maintenance Date Due  
 PAP AKA CERVICAL CYTOLOGY 7/26/2019 DTaP/Tdap/Td series (2 - Td) 12/7/2026 Allergies as of 3/7/2017  Review Complete On: 2/27/2017 By: Stephanie Larkin MD  
 No Known Allergies Current Immunizations  Reviewed on 11/22/2016 Name Date Influenza Vaccine (Quad) PF 10/25/2016 Tdap 12/7/2016 Not reviewed this visit You Were Diagnosed With   
  
 Codes Comments Pain with urination    -  Primary ICD-10-CM: R30.9 ICD-9-CM: 136. 1 Vitals BP Pulse Temp Resp Height(growth percentile) Weight(growth percentile) 121/84 (BP 1 Location: Left arm, BP Patient Position: Sitting) 80 98 °F (36.7 °C) (Oral) 18 5' 2\" (1.575 m) 140 lb (63.5 kg) LMP SpO2 BMI OB Status Smoking Status 05/23/2016 97% 25.61 kg/m2 Pregnant Never Smoker Vitals History BMI and BSA Data Body Mass Index Body Surface Area  
 25.61 kg/m 2 1.67 m 2 Preferred Pharmacy Pharmacy Name Phone [address] [facility] AT 4 [facility]. [phone] Your Updated Medication List  
  
   
This list is accurate as of: 3/7/17  5:16 PM.  Always use your most recent med list.  
  
  
  
  
 docusate sodium 100 mg capsule Commonly known as:  Maretta Olivera Take 1 Cap by mouth two (2) times a day for 90 days. Indications: Constipation  
  
 ibuprofen 800 mg tablet Commonly known as:  MOTRIN  
 Take 1 Tab by mouth every eight (8) hours as needed for Pain. oxyCODONE-acetaminophen 5-325 mg per tablet Commonly known as:  PERCOCET Take 1 Tab by mouth every four (4) hours as needed. Max Daily Amount: 6 Tabs. PNV no.24-iron-folic acid-dha -992 mg-mcg-mg Cmpk Take  by mouth. trimethoprim-sulfamethoxazole 160-800 mg per tablet Commonly known as:  BACTRIM DS, SEPTRA DS Take 1 Tab by mouth two (2) times a day for 3 days. zolpidem 5 mg tablet Commonly known as:  AMBIEN Take 1 Tab by mouth nightly as needed for Sleep. Max Daily Amount: 5 mg. Prescriptions Sent to Pharmacy Refills  
 trimethoprim-sulfamethoxazole (BACTRIM DS, SEPTRA DS) 160-800 mg per tablet 0 Sig: Take 1 Tab by mouth two (2) times a day for 3 days. Class: Normal  
 Pharmacy: [facility] [address] [place] [address], [address] [place] [address] AT [address] [place] [address]. Ph #: [phone] Route: Oral  
  
We Performed the Following AMB POC URINALYSIS DIP STICK AUTO W/O MICRO [73116 CPT(R)] Introducing [facility] & HEALTH SERVICES! Dear Kayla: Thank you for requesting a Answerology account. Our records indicate that you already have an active Answerology account. You can access your account anytime at [email or web address] Control [email or web address] Did you know that you can access your hospital and ER discharge instructions at any time in Answerology? You can also review all of your test results from your hospital stay or ER visit. Additional Information If you have questions, please visit the Frequently Asked Questions section of the Answerology website at [email or web address] Remember, Answerology is NOT to be used for urgent needs. For medical emergencies, dial 911. Now available from your iPhone and Android! Please provide this summary of care documentation to your next provider. Your primary care clinician is listed as [address] [place] [address].  If you have any questions after today's visit, please call [phone].

## 2017-03-07 NOTE — PROGRESS NOTES
Presents postpartum after emergent  delivery for what resulted in stillbirth -- still awaiting pathology report from autopsy. Patient presents with her aunt -- she was tearful today, stating that at times she is sad and other times she feels happy -- this she states makes her feel guilty. Her daughter (~ 6years old) is also asking a lot of questions about the baby and states that this is difficult for her because she does not have any answers for her. Patient states that she does not have a relationship with the FOB -- she states that he wanted a paternity test even though she knows that the infant was his. She stated that she had been in a relationship with him for 6 years and she is now also dealing with the breakup from him. Pt is also complaining of dysuria -- states has had pain for one day. Urine dipstix positive for leukocytes (1+). Will send urine culture and start her on 3 days of bactrim DS. Greater than 50% of this 30 minute visit was counseling about coping and grieving the death of her infant. RTC in 3 weeks.

## 2017-03-07 NOTE — LETTER
3/7/2017 1:44 PM 
 
Ms. Katz Slice [address]. [address] [phone] To whom it may concern, On 02/08/2017, Ms. Flora Esparza had went into [address] [place] [address] and Delivery for an emergent delivery. Her delivery resulted as a fetal demise. If you should have any questions or concerns, please feel free to contact me. Sincerely, Shayne Byrd MD

## 2017-03-21 ENCOUNTER — OFFICE VISIT (OUTPATIENT)
Dept: FAMILY MEDICINE CLINIC | Age: 32
End: 2017-03-21

## 2017-03-21 VITALS
RESPIRATION RATE: 18 BRPM | TEMPERATURE: 98.7 F | HEIGHT: 62 IN | OXYGEN SATURATION: 98 % | SYSTOLIC BLOOD PRESSURE: 91 MMHG | DIASTOLIC BLOOD PRESSURE: 55 MMHG | HEART RATE: 78 BPM | WEIGHT: 142 LBS | BODY MASS INDEX: 26.13 KG/M2

## 2017-03-21 NOTE — PROGRESS NOTES
Chief Complaint   Patient presents with    Follow-up     fetal demise     1. Have you been to the ER, urgent care clinic since your last visit? Hospitalized since your last visit? No    2. Have you seen or consulted any other health care providers outside of the [address] [place] [address] since your last visit? Include any pap smears or colon screening.  No

## 2017-03-21 NOTE — MR AVS SNAPSHOT
Visit Information Date & Time Provider Department Dept. Phone Encounter #  
 3/21/2017 11:05 AM Diliatrevor JallohErwin [phone] [id number] Your Appointments 4/18/2017 11:05 AM  
ROUTINE CARE with Dilia Jalloh MD  
Erwin Pritchett ([facility]) Appt Note: depression Rubi Madre Selma Teresa Carlos [address] [facility]  
[phone]  
  
   
 Rubi Madre Selma Teresa Carlos [facility] [phone] Upcoming Health Maintenance Date Due  
 PAP AKA CERVICAL CYTOLOGY 7/26/2019 DTaP/Tdap/Td series (2 - Td) 12/7/2026 Allergies as of 3/21/2017  Review Complete On: 3/21/2017 By: Cami Henderson LPN No Known Allergies Current Immunizations  Reviewed on 11/22/2016 Name Date Influenza Vaccine (Quad) PF 10/25/2016 Tdap 12/7/2016 Not reviewed this visit Vitals BP Pulse Temp Resp Height(growth percentile) Weight(growth percentile) 91/55 (BP 1 Location: Left arm, BP Patient Position: Sitting) 78 98.7 °F (37.1 °C) (Oral) 18 5' 2\" (1.575 m) 142 lb (64.4 kg) LMP SpO2 BMI OB Status Smoking Status 05/23/2016 98% 25.97 kg/m2 Pregnant Never Smoker BMI and BSA Data Body Mass Index Body Surface Area  
 25.97 kg/m 2 1.68 m 2 Preferred Pharmacy Pharmacy Name Phone [address] [facility] AT [address] [place] [address]. [phone] Your Updated Medication List  
  
   
This list is accurate as of: 3/21/17  5:18 PM.  Always use your most recent med list.  
  
  
  
  
 docusate sodium 100 mg capsule Commonly known as:  Dorthey Matsu Take 1 Cap by mouth two (2) times a day for 90 days. Indications: Constipation  
  
 ibuprofen 800 mg tablet Commonly known as:  MOTRIN Take 1 Tab by mouth every eight (8) hours as needed for Pain. PNV no.24-iron-folic acid-dha -856 mg-mcg-mg Cmpk Take  by mouth.  
  
 zolpidem 5 mg tablet Commonly known as:  AMBIEN Take 1 Tab by mouth nightly as needed for Sleep. Max Daily Amount: 5 mg. Introducing [facility] & [facility] SERVICES! Dear Tyree Varner: Thank you for requesting a Matchpoint account. Our records indicate that you already have an active Matchpoint account. You can access your account anytime at [email or web address] Did you know that you can access your hospital and ER discharge instructions at any time in Matchpoint? You can also review all of your test results from your hospital stay or ER visit. Additional Information If you have questions, please visit the Frequently Asked Questions section of the Matchpoint website at [email or web address] Remember, Matchpoint is NOT to be used for urgent needs. For medical emergencies, dial 911. Now available from your iPhone and Android! Please provide this summary of care documentation to your next provider. Your primary care clinician is listed as [address] [place] [address]. If you have any questions after today's visit, please call [phone].

## 2017-03-22 PROBLEM — N30.01 ACUTE CYSTITIS WITH HEMATURIA: Status: RESOLVED | Noted: 2017-03-07 | Resolved: 2017-03-22

## 2017-03-22 PROBLEM — O36.8190 DECREASED FETAL MOVEMENT DETERMINED BY EXAMINATION: Status: RESOLVED | Noted: 2017-02-08 | Resolved: 2017-03-22

## 2017-03-22 NOTE — PROGRESS NOTES
Followup visit for unexpected fetal demise:    Patient reports that she is feeling well. Discussed with patient pathology results from autopsy -- not definitive but per Dr. 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 Pt states that her daughter is continuing to have some problems -- she has her seeing a counselor. Pt will followup in one month. Inspection of incision revealed well healed incision. Greater than 50% of this 15 minute visit was counseling about recent fetal death.

## 2017-04-18 ENCOUNTER — OFFICE VISIT (OUTPATIENT)
Dept: FAMILY MEDICINE CLINIC | Age: 32
End: 2017-04-18

## 2017-04-18 VITALS
WEIGHT: 138 LBS | RESPIRATION RATE: 18 BRPM | TEMPERATURE: 98.7 F | DIASTOLIC BLOOD PRESSURE: 65 MMHG | OXYGEN SATURATION: 100 % | SYSTOLIC BLOOD PRESSURE: 98 MMHG | BODY MASS INDEX: 25.4 KG/M2 | HEIGHT: 62 IN | HEART RATE: 73 BPM

## 2017-04-18 DIAGNOSIS — N94.6 DYSMENORRHEA: ICD-10-CM

## 2017-04-18 RX ORDER — NORGESTIMATE AND ETHINYL ESTRADIOL 0.25-0.035
1 KIT ORAL DAILY
Qty: 1 PACKAGE | Refills: 12 | Status: SHIPPED | OUTPATIENT
Start: 2017-04-18 | End: 2017-11-27 | Stop reason: SDUPTHER

## 2017-04-18 NOTE — PROGRESS NOTES
Chief Complaint   Patient presents with    Depression     postpasrtum     1. Have you been to the ER, urgent care clinic since your last visit? Hospitalized since your last visit? No    2. Have you seen or consulted any other health care providers outside of the [address] [place] [address] since your last visit? Include any pap smears or colon screening.  No

## 2017-04-18 NOTE — PROGRESS NOTES
Presents for followup     States that she is doing ok    Has gone back to work -- good diversion but difficult when people ask her about the baby    Her daughter is currently in [place], [place] going to [facility] Chip Mahajan about taking medication to help regulate her menses -- will start her on OCP    Denies any contraindications to hormone therapy    Greater than 50% of this 20 minute visit was counseling about postpartum depression and dysmenorrhea.

## 2017-05-19 ENCOUNTER — TELEPHONE (OUTPATIENT)
Dept: FAMILY MEDICINE CLINIC | Age: 32
End: 2017-05-19

## 2017-05-19 NOTE — TELEPHONE ENCOUNTER
----- Message from Patricia Cox sent at 5/19/2017  2:33 PM EDT -----  Regarding: /Telephone  Pt is requesting  to call to talk to her about the depression before the appt time. Pt has an scheduled appt on June 6, 2017. Pt best contact number is [phone]. Pt also wish to be seen sooner.

## 2017-05-23 ENCOUNTER — OFFICE VISIT (OUTPATIENT)
Dept: FAMILY MEDICINE CLINIC | Age: 32
End: 2017-05-23

## 2017-05-23 VITALS
TEMPERATURE: 98.1 F | SYSTOLIC BLOOD PRESSURE: 106 MMHG | HEIGHT: 62 IN | WEIGHT: 138 LBS | HEART RATE: 81 BPM | BODY MASS INDEX: 25.4 KG/M2 | RESPIRATION RATE: 16 BRPM | DIASTOLIC BLOOD PRESSURE: 68 MMHG

## 2017-05-23 RX ORDER — SERTRALINE HYDROCHLORIDE 50 MG/1
50 TABLET, FILM COATED ORAL DAILY
Qty: 30 TAB | Refills: 3 | Status: SHIPPED | OUTPATIENT
Start: 2017-05-23 | End: 2017-06-07 | Stop reason: SINTOL

## 2017-06-07 ENCOUNTER — OFFICE VISIT (OUTPATIENT)
Dept: FAMILY MEDICINE CLINIC | Age: 32
End: 2017-06-07

## 2017-06-07 VITALS
OXYGEN SATURATION: 100 % | DIASTOLIC BLOOD PRESSURE: 69 MMHG | SYSTOLIC BLOOD PRESSURE: 115 MMHG | TEMPERATURE: 99 F | BODY MASS INDEX: 25.03 KG/M2 | HEART RATE: 67 BPM | WEIGHT: 136 LBS | HEIGHT: 62 IN | RESPIRATION RATE: 16 BRPM

## 2017-06-07 RX ORDER — CITALOPRAM 10 MG/1
10 TABLET ORAL DAILY
Qty: 30 TAB | Refills: 1 | Status: SHIPPED | OUTPATIENT
Start: 2017-06-07 | End: 2017-08-31

## 2017-06-07 NOTE — MR AVS SNAPSHOT
Visit Information Date & Time Provider Department Dept. Phone Encounter #  
 6/7/2017  3:55 PM Kiarra LynneErwin [phone] [id number] Your Appointments 7/5/2017  3:50 PM  
ROUTINE CARE with Kiarra Lynne MD  
Erwin Pritchett ([facility]) Appt Note: f/u to depression 9250 for[MD] [facility] [facility]  
[phone]  
  
   
 9[address] for[MD] [address] [address] Upcoming Health Maintenance Date Due INFLUENZA AGE 9 TO ADULT 8/1/2017 PAP AKA CERVICAL CYTOLOGY 7/26/2019 DTaP/Tdap/Td series (2 - Td) 12/7/2026 Allergies as of 6/7/2017  Review Complete On: 5/23/2017 By: Kiarra Lynne MD  
  
 Severity Noted Reaction Type Reactions Sertraline  06/07/2017    Nausea Only Current Immunizations  Reviewed on 11/22/2016 Name Date Influenza Vaccine (Quad) PF 10/25/2016 Tdap 12/7/2016 Not reviewed this visit You Were Diagnosed With   
  
 Codes Comments Postpartum depression    -  Primary ICD-10-CM: F53 
ICD-9-CM: 648.44, 311 Vitals BP Pulse Temp Resp Height(growth percentile) Weight(growth percentile)  
 115/69 67 99 °F (37.2 °C) (Oral) 16 5' 2\" (1.575 m) 136 lb (61.7 kg) LMP SpO2 BMI OB Status Smoking Status 05/23/2016 100% 24.87 kg/m2 Recent pregnancy Never Smoker BMI and BSA Data Body Mass Index Body Surface Area  
 24.87 kg/m 2 1.64 m 2 Preferred Pharmacy Pharmacy Name Phone [address] [facility] AT [address] [place] [address]. [phone] Your Updated Medication List  
  
   
This list is accurate as of: 6/7/17  5:29 PM.  Always use your most recent med list.  
  
  
  
  
 citalopram 10 mg tablet Commonly known as:  Isela Beal Take 1 Tab by mouth daily. norgestimate-ethinyl estradiol 0.25-35 mg-mcg Tab Commonly known as:  Jessica Morris Take 1 Tab by mouth daily. Indications: DYSMENORRHEA  
  
 pnv w/o calcium-iron fum-fa 27-1 mg Tab Take  by mouth. Prescriptions Sent to Pharmacy Refills  
 citalopram (CELEXA) 10 mg tablet 1 Sig: Take 1 Tab by mouth daily. Class: Normal  
 Pharmacy: [facility] [address] [place] [address] 20 [facility] AT [address] [facility] [address]. Ph #: [phone] Route: Oral  
  
Introducing [facility] & [facility] SERVICES! Dear Jacob Villalba: Thank you for requesting a Qlika account. Our records indicate that you already have an active Qlika account. You can access your account anytime at [email or web address]. Xcel[employer] (Chapter [employer])/Digital Media Holdings Did you know that you can access your hospital and ER discharge instructions at any time in Qlika? You can also review all of your test results from your hospital stay or ER visit. Additional Information If you have questions, please visit the Frequently Asked Questions section of the Qlika website at [email or web address] Remember, Qlika is NOT to be used for urgent needs. For medical emergencies, dial 911. Now available from your iPhone and Android! Please provide this summary of care documentation to your next provider. Your primary care clinician is listed as [address] [place] [address], . If you have any questions after today's visit, please call [phone].

## 2017-06-07 NOTE — PROGRESS NOTES
Chief Complaint   Patient presents with    Depression     Unable to tolerate Sertraline. Made her nauseated. 1. Have you been to the ER, urgent care clinic since your last visit? Hospitalized since your last visit? No    2. Have you seen or consulted any other health care providers outside of the [facility] since your last visit? Include any pap smears or colon screening.  No

## 2017-06-08 NOTE — PROGRESS NOTES
Presents for followup    At last visit she was started on sertraline for depression and anxiety    Her 44 week old male fetus was stillborn after delivery by emergent  delivery in February    She continues to grieve; is working; seeing a counselor weekly    She has family support and has a 9year old daughter    Pt states that sertraline made her nauseated and she was thus unable to tolerate it    After discussing decided that she probably could still benefit from an antidepressant    Will switch to low dose celexa    F/U in one month to see how she is doing    Greater than 50% of this 20 minute visit was counseling about situational depression.

## 2017-07-05 ENCOUNTER — OFFICE VISIT (OUTPATIENT)
Dept: FAMILY MEDICINE CLINIC | Age: 32
End: 2017-07-05

## 2017-07-05 VITALS
RESPIRATION RATE: 18 BRPM | HEART RATE: 69 BPM | TEMPERATURE: 98.1 F | DIASTOLIC BLOOD PRESSURE: 63 MMHG | WEIGHT: 137 LBS | OXYGEN SATURATION: 100 % | BODY MASS INDEX: 25.21 KG/M2 | SYSTOLIC BLOOD PRESSURE: 100 MMHG | HEIGHT: 62 IN

## 2017-08-31 ENCOUNTER — OFFICE VISIT (OUTPATIENT)
Dept: FAMILY MEDICINE CLINIC | Age: 32
End: 2017-08-31

## 2017-08-31 VITALS
HEIGHT: 62 IN | OXYGEN SATURATION: 98 % | RESPIRATION RATE: 16 BRPM | TEMPERATURE: 98.6 F | HEART RATE: 54 BPM | BODY MASS INDEX: 23.92 KG/M2 | SYSTOLIC BLOOD PRESSURE: 98 MMHG | DIASTOLIC BLOOD PRESSURE: 62 MMHG | WEIGHT: 130 LBS

## 2017-08-31 DIAGNOSIS — Z98.891 HX OF CESAREAN SECTION: ICD-10-CM

## 2017-08-31 DIAGNOSIS — Z01.419 WELL WOMAN EXAM: Primary | ICD-10-CM

## 2017-08-31 DIAGNOSIS — Z00.00 WELL WOMAN EXAM (NO GYNECOLOGICAL EXAM): ICD-10-CM

## 2017-08-31 NOTE — PROGRESS NOTES
Presents for annual exam    Only taking birth control    Periods are regular    Denies any breast problems    Only concern is when she eats something with a lot of sugar or a lot of grease -- causes discomfort -- advised patient to keep a log of when this occurs and will attempt to determine etiology    Subjective:   36 y.o. female for Well Woman Check. Patient Active Problem List   Diagnosis Code     delivery delivered O82    Postpartum depression F53     Current Outpatient Prescriptions   Medication Sig Dispense Refill    norgestimate-ethinyl estradiol (ORTHO-CYCLEN, SPRINTEC) 0.25-35 mg-mcg tab Take 1 Tab by mouth daily. Indications: DYSMENORRHEA 1 Package 12    pnv w/o calcium-iron fum-fa 27-1 mg tab Take  by mouth. Allergies   Allergen Reactions    Sertraline Nausea Only        Lab Results  Component Value Date/Time   WBC 16.3 2017 04:04 AM   HGB 11.4 2017 04:04 AM   Hemoglobin (POC) 13.6 2014 02:48 PM   HCT 34.3 2017 04:04 AM   Hematocrit (POC) 40 2014 02:48 PM   PLATELET 375  04:04 AM   MCV 89.6 2017 04:04 AM     No results found for: TSH, TSH2, TSH3, TSHP, TSHELE, TSHEXT, TT3, T3U, T3UP, FRT3, FT3, FT4, FT4P, T4, T4P, FT4T, TT7, TSHEXT   Lab Results   Component Value Date/Time    Sodium 138 2017 04:18 PM    Potassium 3.7 2017 04:18 PM    Chloride 101 2017 04:18 PM    CO2 20 2017 04:18 PM    Anion gap 5 2014 04:15 PM    Glucose 155 2017 04:18 PM    BUN 8 2017 04:18 PM    Creatinine 0.49 2017 04:18 PM    BUN/Creatinine ratio 16 2017 04:18 PM    GFR est  2017 04:18 PM    GFR est non- 2017 04:18 PM    Calcium 8.4 2017 04:18 PM    Bilirubin, total 0.2 2017 04:18 PM    ALT (SGPT) 45 2017 04:18 PM    AST (SGOT) 33 2017 04:18 PM    Alk.  phosphatase 104 2017 04:18 PM    Protein, total 5.7 2017 04:18 PM    Albumin 3.5 2017 04:18 PM A-G Ratio 1.6 2017 04:18 PM               ROS: Feeling generally well. No TIA's or unusual headaches, no dysphagia. No prolonged cough. No dyspnea or chest pain on exertion. No abdominal pain, change in bowel habits, black or bloody stools. No urinary tract symptoms. No new or unusual musculoskeletal symptoms. Specific concerns today: none. Objective: The patient appears well, alert, oriented x 3, in no distress. Visit Vitals    BP 98/62 (BP 1 Location: Left arm, BP Patient Position: Sitting)    Pulse (!) 54    Temp 98.6 °F (37 °C) (Oral)    Resp 16    Ht 5' 2\" (1.575 m)    Wt 130 lb (59 kg)    SpO2 98%    BMI 23.78 kg/m2     ENT normal.  Neck supple. No adenopathy or thyromegaly. ANN. Lungs are clear, good air entry, no wheezes, rhonchi or rales. S1 and S2 normal, no murmurs, regular rate and rhythm. Abdomen soft without tenderness, guarding, mass or organomegaly. Incision -- well healed. Extremities show no edema, normal peripheral pulses. Neurological is normal, no focal findings. Assessment/Plan:   Well Woman  Situational grief due to death of her fetus  continue present plan, routine labs ordered, call if any problems    ICD-10-CM ICD-9-CM    1. Well woman exam F58.939 M60.80 METABOLIC PANEL, COMPREHENSIVE      LIPID PANEL      CBC W/O DIFF   2. Postpartum depression F53 648.44      311    3. Hx of  section Z98.891 V45.89 CBC W/O DIFF   4.  Well woman exam (no gynecological exam) Z00.00 V70.0     [V70.0]

## 2017-08-31 NOTE — LETTER
9/6/2017 3:29 PM 
 
Ms. Mercedes Oakes [address] [facility]. [address] [facility] [id number] Dear Mercedes Oakes: 
 
Please find your most recent results below. Resulted Orders METABOLIC PANEL, COMPREHENSIVE Result Value Ref Range Glucose 81 65 - 99 mg/dL BUN 12 6 - 20 mg/dL Creatinine 0.54 (L) 0.57 - 1.00 mg/dL GFR est non- >59 mL/min/1.73 GFR est  >59 mL/min/1.73  
 BUN/Creatinine ratio 22 9 - 23 Sodium 140 134 - 144 mmol/L Potassium 4.4 3.5 - 5.2 mmol/L Chloride 101 96 - 106 mmol/L  
 CO2 24 18 - 29 mmol/L Calcium 9.1 8.7 - 10.2 mg/dL Protein, total 7.0 6.0 - 8.5 g/dL Albumin 4.5 3.5 - 5.5 g/dL GLOBULIN, TOTAL 2.5 1.5 - 4.5 g/dL A-G Ratio 1.8 1.2 - 2.2 Bilirubin, total 0.4 0.0 - 1.2 mg/dL Alk. phosphatase 40 39 - 117 IU/L  
 AST (SGOT) 19 0 - 40 IU/L  
 ALT (SGPT) 23 0 - 32 IU/L Narrative Performed at:  [address]  [id number] : Lucy Lundberg MD, Phone:  [phone] LIPID PANEL Result Value Ref Range Cholesterol, total 160 100 - 199 mg/dL Triglyceride 83 0 - 149 mg/dL HDL Cholesterol 81 >39 mg/dL VLDL, calculated 17 5 - 40 mg/dL LDL, calculated 62 0 - 99 mg/dL Narrative Performed at:  [address]  [id number] : Lucy Lundberg MD, Phone:  [phone] CBC W/O DIFF Result Value Ref Range WBC 4.6 3.4 - 10.8 x10E3/uL  
 RBC 4.67 3.77 - 5.28 x10E6/uL HGB 13.7 11.1 - 15.9 g/dL HCT 41.9 34.0 - 46.6 % MCV 90 79 - 97 fL  
 MCH 29.3 26.6 - 33.0 pg  
 MCHC 32.7 31.5 - 35.7 g/dL  
 RDW 13.2 12.3 - 15.4 % PLATELET 481 554 - 977 x10E3/uL Narrative Performed at:  [address]  [id number] : Lucy Lundberg MD, Phone:  [phone] CVD REPORT Result Value Ref Range INTERPRETATION Note Comment: Supplement report is available. Narrative Performed at:  [address] [place] [address]  [id number] : Doni Urbano PhD, Phone:  [phone] RECOMMENDATIONS: 
 
Normal labs Please call me if you have any questions: [phone] Sincerely, Griselda Marie MD

## 2017-09-01 LAB
ALBUMIN SERPL-MCNC: 4.5 G/DL (ref 3.5–5.5)
ALBUMIN/GLOB SERPL: 1.8 {RATIO} (ref 1.2–2.2)
ALP SERPL-CCNC: 40 IU/L (ref 39–117)
ALT SERPL-CCNC: 23 IU/L (ref 0–32)
AST SERPL-CCNC: 19 IU/L (ref 0–40)
BILIRUB SERPL-MCNC: 0.4 MG/DL (ref 0–1.2)
BUN SERPL-MCNC: 12 MG/DL (ref 6–20)
BUN/CREAT SERPL: 22 (ref 9–23)
CALCIUM SERPL-MCNC: 9.1 MG/DL (ref 8.7–10.2)
CHLORIDE SERPL-SCNC: 101 MMOL/L (ref 96–106)
CHOLEST SERPL-MCNC: 160 MG/DL (ref 100–199)
CO2 SERPL-SCNC: 24 MMOL/L (ref 18–29)
CREAT SERPL-MCNC: 0.54 MG/DL (ref 0.57–1)
ERYTHROCYTE [DISTWIDTH] IN BLOOD BY AUTOMATED COUNT: 13.2 % (ref 12.3–15.4)
GLOBULIN SER CALC-MCNC: 2.5 G/DL (ref 1.5–4.5)
GLUCOSE SERPL-MCNC: 81 MG/DL (ref 65–99)
HCT VFR BLD AUTO: 41.9 % (ref 34–46.6)
HDLC SERPL-MCNC: 81 MG/DL
HGB BLD-MCNC: 13.7 G/DL (ref 11.1–15.9)
INTERPRETATION, 910389: NORMAL
LDLC SERPL CALC-MCNC: 62 MG/DL (ref 0–99)
MCH RBC QN AUTO: 29.3 PG (ref 26.6–33)
MCHC RBC AUTO-ENTMCNC: 32.7 G/DL (ref 31.5–35.7)
MCV RBC AUTO: 90 FL (ref 79–97)
PLATELET # BLD AUTO: 279 X10E3/UL (ref 150–379)
POTASSIUM SERPL-SCNC: 4.4 MMOL/L (ref 3.5–5.2)
PROT SERPL-MCNC: 7 G/DL (ref 6–8.5)
RBC # BLD AUTO: 4.67 X10E6/UL (ref 3.77–5.28)
SODIUM SERPL-SCNC: 140 MMOL/L (ref 134–144)
TRIGL SERPL-MCNC: 83 MG/DL (ref 0–149)
VLDLC SERPL CALC-MCNC: 17 MG/DL (ref 5–40)
WBC # BLD AUTO: 4.6 X10E3/UL (ref 3.4–10.8)

## 2017-11-27 DIAGNOSIS — N94.6 DYSMENORRHEA: Primary | ICD-10-CM

## 2017-11-27 RX ORDER — NORGESTIMATE AND ETHINYL ESTRADIOL 0.25-0.035
1 KIT ORAL DAILY
Qty: 3 PACKAGE | Refills: 4 | Status: SHIPPED | OUTPATIENT
Start: 2017-11-27 | End: 2020-01-06

## 2018-05-01 ENCOUNTER — APPOINTMENT (OUTPATIENT)
Dept: CT IMAGING | Age: 33
End: 2018-05-01
Attending: PHYSICIAN ASSISTANT
Payer: SELF-PAY

## 2018-05-01 ENCOUNTER — HOSPITAL ENCOUNTER (EMERGENCY)
Age: 33
Discharge: HOME | End: 2018-05-01
Attending: STUDENT IN AN ORGANIZED HEALTH CARE EDUCATION/TRAINING PROGRAM | Admitting: STUDENT IN AN ORGANIZED HEALTH CARE EDUCATION/TRAINING PROGRAM
Payer: SELF-PAY

## 2018-05-01 VITALS
DIASTOLIC BLOOD PRESSURE: 76 MMHG | RESPIRATION RATE: 16 BRPM | HEIGHT: 63 IN | HEART RATE: 81 BPM | TEMPERATURE: 98.6 F | OXYGEN SATURATION: 100 % | SYSTOLIC BLOOD PRESSURE: 112 MMHG | WEIGHT: 144.25 LBS | BODY MASS INDEX: 25.56 KG/M2

## 2018-05-01 DIAGNOSIS — S39.012A STRAIN OF LUMBAR REGION, INITIAL ENCOUNTER: ICD-10-CM
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
## 2018-05-01 DIAGNOSIS — R10.84 ABDOMINAL PAIN, GENERALIZED: ICD-10-CM

## 2018-05-01 LAB
ALBUMIN SERPL-MCNC: 3.9 G/DL (ref 3.5–5)
ALBUMIN/GLOB SERPL: 1.2 {RATIO} (ref 1.1–2.2)
ALP SERPL-CCNC: 50 U/L (ref 45–117)
ALT SERPL-CCNC: 20 U/L (ref 12–78)
ANION GAP SERPL CALC-SCNC: 3 MMOL/L (ref 5–15)
AST SERPL-CCNC: 11 U/L (ref 15–37)
BASOPHILS # BLD: 0 K/UL (ref 0–0.1)
BASOPHILS NFR BLD: 0 % (ref 0–1)
BILIRUB SERPL-MCNC: 0.4 MG/DL (ref 0.2–1)
BUN SERPL-MCNC: 10 MG/DL (ref 6–20)
BUN/CREAT SERPL: 14 (ref 12–20)
CALCIUM SERPL-MCNC: 8.8 MG/DL (ref 8.5–10.1)
CHLORIDE SERPL-SCNC: 110 MMOL/L (ref 97–108)
CO2 SERPL-SCNC: 26 MMOL/L (ref 21–32)
CREAT SERPL-MCNC: 0.7 MG/DL (ref 0.55–1.02)
DIFFERENTIAL METHOD BLD: NORMAL
EOSINOPHIL # BLD: 0.1 K/UL (ref 0–0.4)
EOSINOPHIL NFR BLD: 1 % (ref 0–7)
ERYTHROCYTE [DISTWIDTH] IN BLOOD BY AUTOMATED COUNT: 13 % (ref 11.5–14.5)
GLOBULIN SER CALC-MCNC: 3.3 G/DL (ref 2–4)
GLUCOSE SERPL-MCNC: 86 MG/DL (ref 65–100)
HCG UR QL: NEGATIVE
HCT VFR BLD AUTO: 39.4 % (ref 35–47)
HGB BLD-MCNC: 13.4 G/DL (ref 11.5–16)
IMM GRANULOCYTES # BLD: 0 K/UL (ref 0–0.04)
IMM GRANULOCYTES NFR BLD AUTO: 0 % (ref 0–0.5)
LYMPHOCYTES # BLD: 1.9 K/UL (ref 0.8–3.5)
LYMPHOCYTES NFR BLD: 36 % (ref 12–49)
MCH RBC QN AUTO: 30.9 PG (ref 26–34)
MCHC RBC AUTO-ENTMCNC: 34 G/DL (ref 30–36.5)
MCV RBC AUTO: 91 FL (ref 80–99)
MONOCYTES # BLD: 0.4 K/UL (ref 0–1)
MONOCYTES NFR BLD: 6 % (ref 5–13)
NEUTS SEG # BLD: 3 K/UL (ref 1.8–8)
NEUTS SEG NFR BLD: 56 % (ref 32–75)
NRBC # BLD: 0 K/UL (ref 0–0.01)
NRBC BLD-RTO: 0 PER 100 WBC
PLATELET # BLD AUTO: 260 K/UL (ref 150–400)
PMV BLD AUTO: 9.4 FL (ref 8.9–12.9)
POTASSIUM SERPL-SCNC: 3.7 MMOL/L (ref 3.5–5.1)
PROT SERPL-MCNC: 7.2 G/DL (ref 6.4–8.2)
RBC # BLD AUTO: 4.33 M/UL (ref 3.8–5.2)
SODIUM SERPL-SCNC: 139 MMOL/L (ref 136–145)
WBC # BLD AUTO: 5.4 K/UL (ref 3.6–11)

## 2018-05-01 PROCEDURE — 74177 CT ABD & PELVIS W/CONTRAST: CPT

## 2018-05-01 PROCEDURE — 74011250636 HC RX REV CODE- 250/636: Performed by: PHYSICIAN ASSISTANT

## 2018-05-01 PROCEDURE — 74011636320 HC RX REV CODE- 636/320: Performed by: STUDENT IN AN ORGANIZED HEALTH CARE EDUCATION/TRAINING PROGRAM

## 2018-05-01 PROCEDURE — 96361 HYDRATE IV INFUSION ADD-ON: CPT

## 2018-05-01 PROCEDURE — 96374 THER/PROPH/DIAG INJ IV PUSH: CPT

## 2018-05-01 PROCEDURE — 99283 EMERGENCY DEPT VISIT LOW MDM: CPT

## 2018-05-01 PROCEDURE — 80053 COMPREHEN METABOLIC PANEL: CPT | Performed by: PHYSICIAN ASSISTANT

## 2018-05-01 PROCEDURE — 36415 COLL VENOUS BLD VENIPUNCTURE: CPT | Performed by: STUDENT IN AN ORGANIZED HEALTH CARE EDUCATION/TRAINING PROGRAM

## 2018-05-01 PROCEDURE — 74011000258 HC RX REV CODE- 258: Performed by: STUDENT IN AN ORGANIZED HEALTH CARE EDUCATION/TRAINING PROGRAM

## 2018-05-01 PROCEDURE — 70450 CT HEAD/BRAIN W/O DYE: CPT

## 2018-05-01 PROCEDURE — 81025 URINE PREGNANCY TEST: CPT

## 2018-05-01 PROCEDURE — 85025 COMPLETE CBC W/AUTO DIFF WBC: CPT | Performed by: PHYSICIAN ASSISTANT

## 2018-05-01 RX ORDER — KETOROLAC TROMETHAMINE 30 MG/ML
15 INJECTION, SOLUTION INTRAMUSCULAR; INTRAVENOUS
Status: COMPLETED | OUTPATIENT
Start: 2018-05-01 | End: 2018-05-01

## 2018-05-01 RX ORDER — SODIUM CHLORIDE 0.9 % (FLUSH) 0.9 %
10 SYRINGE (ML) INJECTION
Status: COMPLETED | OUTPATIENT
Start: 2018-05-01 | End: 2018-05-01

## 2018-05-01 RX ADMIN — IOPAMIDOL 100 ML: 755 INJECTION, SOLUTION INTRAVENOUS at 15:55

## 2018-05-01 RX ADMIN — KETOROLAC TROMETHAMINE 15 MG: 30 INJECTION, SOLUTION INTRAMUSCULAR at 14:31

## 2018-05-01 RX ADMIN — Medication 10 ML: at 15:55

## 2018-05-01 RX ADMIN — SODIUM CHLORIDE 1000 ML: 900 INJECTION, SOLUTION INTRAVENOUS at 14:31

## 2018-05-01 RX ADMIN — SODIUM CHLORIDE 100 ML: 900 INJECTION, SOLUTION INTRAVENOUS at 15:55

## 2018-05-01 NOTE — ROUTINE PROCESS
Reviewed discharge instructions with the patient who verbalized understanding and denied having any further questions. PIV d/thu.
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
## 2018-05-01 NOTE — DISCHARGE INSTRUCTIONS
Abdominal Pain: Care Instructions  Your Care Instructions    Abdominal pain has many possible causes. Some aren't serious and get better on their own in a few days. Others need more testing and treatment. If your pain continues or gets worse, you need to be rechecked and may need more tests to find out what is wrong. You may need surgery to correct the problem. Don't ignore new symptoms, such as fever, nausea and vomiting, urination problems, pain that gets worse, and dizziness. These may be signs of a more serious problem. Your doctor may have recommended a follow-up visit in the next 8 to 12 hours. If you are not getting better, you may need more tests or treatment. The doctor has checked you carefully, but problems can develop later. If you notice any problems or new symptoms, get medical treatment right away. Follow-up care is a key part of your treatment and safety. Be sure to make and go to all appointments, and call your doctor if you are having problems. It's also a good idea to know your test results and keep a list of the medicines you take. How can you care for yourself at home? · Rest until you feel better. · To prevent dehydration, drink plenty of fluids, enough so that your urine is light yellow or clear like water. Choose water and other caffeine-free clear liquids until you feel better. If you have kidney, heart, or liver disease and have to limit fluids, talk with your doctor before you increase the amount of fluids you drink. · If your stomach is upset, eat mild foods, such as rice, dry toast or crackers, bananas, and applesauce. Try eating several small meals instead of two or three large ones. · Wait until 48 hours after all symptoms have gone away before you have spicy foods, alcohol, and drinks that contain caffeine. · Do not eat foods that are high in fat. · Avoid anti-inflammatory medicines such as aspirin, ibuprofen (Advil, Motrin), and naproxen (Aleve).  These can cause stomach upset. Talk to your doctor if you take daily aspirin for another health problem. When should you call for help? Call 911 anytime you think you may need emergency care. For example, call if:  ? · You passed out (lost consciousness). ? · You pass maroon or very bloody stools. ? · You vomit blood or what looks like coffee grounds. ? · You have new, severe belly pain. ?Call your doctor now or seek immediate medical care if:  ? · Your pain gets worse, especially if it becomes focused in one area of your belly. ? · You have a new or higher fever. ? · Your stools are black and look like tar, or they have streaks of blood. ? · You have unexpected vaginal bleeding. ? · You have symptoms of a urinary tract infection. These may include:  ¨ Pain when you urinate. ¨ Urinating more often than usual.  ¨ Blood in your urine. ? · You are dizzy or lightheaded, or you feel like you may faint. ? Watch closely for changes in your health, and be sure to contact your doctor if:  ? · You are not getting better after 1 day (24 hours). Where can you learn more? Go to http://ruiRhythm NewMediapj.info/. Enter Q209 in the search box to learn more about \"Abdominal Pain: Care Instructions. \"  Current as of: March 20, 2017  Content Version: 11.4  © 5360-5865 [employer] [place]. Care instructions adapted under license by [employer] (which disclaims liability or warranty for this information). If you have questions about a medical condition or this instruction, always ask your healthcare professional. [place] [address] any warranty or liability for your use of this information. Back Strain: Care Instructions  Your Care Instructions    Back strain happens when you overstretch, or pull, a muscle in your back. You may hurt your back in an accident or when you exercise or lift something. Most back pain will get better with rest and time.  You can take care of yourself at home to help your back heal.  Follow-up care is a key part of your treatment and safety. Be sure to make and go to all appointments, and call your doctor if you are having problems. It's also a good idea to know your test results and keep a list of the medicines you take. How can you care for yourself at home? · Try to stay as active as you can, but stop or reduce any activity that causes pain. · Put ice or a cold pack on the sore muscle for 10 to 20 minutes at a time to stop swelling. Try this every 1 to 2 hours for 3 days (when you are awake) or until the swelling goes down. Put a thin cloth between the ice pack and your skin. · After 2 or 3 days, apply a heating pad on low or a warm cloth to your back. Some doctors suggest that you go back and forth between hot and cold treatments. · Take pain medicines exactly as directed. ¨ If the doctor gave you a prescription medicine for pain, take it as prescribed. ¨ If you are not taking a prescription pain medicine, ask your doctor if you can take an over-the-counter medicine. · Try sleeping on your side with a pillow between your legs. Or put a pillow under your knees when you lie on your back. These measures can ease pain in your lower back. · Return to your usual level of activity slowly. When should you call for help? Call 911 anytime you think you may need emergency care. For example, call if:  ? · You are unable to move a leg at all. ?Call your doctor now or seek immediate medical care if:  ? · You have new or worse symptoms in your legs, belly, or buttocks. Symptoms may include:  ¨ Numbness or tingling. ¨ Weakness. ¨ Pain. ? · You lose bladder or bowel control. ? Watch closely for changes in your health, and be sure to contact your doctor if you are not getting better as expected. Where can you learn more? Go to http://rui-pj.info/.   Enter N348 in the search box to learn more about \"Back Strain: Care Instructions. \"  Current as of: March 21, 2017  Content Version: 11.4  © 0424-8203 [employer]. Care instructions adapted under license by [employer] (which disclaims liability or warranty for this information). If you have questions about a medical condition or this instruction, always ask your healthcare professional. [address] any warranty or liability for your use of this information. Motor Vehicle Accident: Care Instructions  Your Care Instructions    You were seen by a doctor after a motor vehicle accident. Because of the accident, you may be sore for several days. Over the next few days, you may hurt more than you did just after the accident. The doctor has checked you carefully, but problems can develop later. If you notice any problems or new symptoms, get medical treatment right away. Follow-up care is a key part of your treatment and safety. Be sure to make and go to all appointments, and call your doctor if you are having problems. It's also a good idea to know your test results and keep a list of the medicines you take. How can you care for yourself at home? · Keep track of any new symptoms or changes in your symptoms. · Take it easy for the next few days, or longer if you are not feeling well. Do not try to do too much. · Put ice or a cold pack on any sore areas for 10 to 20 minutes at a time to stop swelling. Put a thin cloth between the ice pack and your skin. Do this several times a day for the first 2 days. · Be safe with medicines. Take pain medicines exactly as directed. ¨ If the doctor gave you a prescription medicine for pain, take it as prescribed. ¨ If you are not taking a prescription pain medicine, ask your doctor if you can take an over-the-counter medicine. · Do not drive after taking a prescription pain medicine. · Do not do anything that makes the pain worse.   · Do not drink any alcohol for 24 hours or until your doctor tells you it is okay. When should you call for help? Call 911 if:  ? · You passed out (lost consciousness). ?Call your doctor now or seek immediate medical care if:  ? · You have new or worse belly pain. ? · You have new or worse trouble breathing. ? · You have new or worse head pain. ? · You have new pain, or your pain gets worse. ? · You have new symptoms, such as numbness or vomiting. ? Watch closely for changes in your health, and be sure to contact your doctor if:  ? · You are not getting better as expected. Where can you learn more? Go to http://rui-pj.info/. Enter F858 in the search box to learn more about \"Motor Vehicle Accident: Care Instructions. \"  Current as of: March 20, 2017  Content Version: 11.4  © 1191-6703 [employer]. Care instructions adapted under license by [employer] (which disclaims liability or warranty for this information). If you have questions about a medical condition or this instruction, always ask your healthcare professional. [address] any warranty or liability for your use of this information.
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
## 2020-01-05 NOTE — PROGRESS NOTES
ROUTINE ANNUAL WELL WOMAN    Subjective   Elke Hernandez is a 30 y.o. female who presents to clinic today for annual physical exam.    She would also like to address the following issues:   · Bump on vagina: Patient states the bump has been present for 5 months on her left labia. She believed it was a follicle but it has slowly been growing. She has been unable to pop it. There has been no drainage, bleeding or erythema. · Pain in breasts: Patient also endorses lateral breast pain that gets worse around the time of her period but is also a constant ache. She denies any trauma but does work as a  and is carrying plates most of the time. She has not felt any changes inside her breasts. Preventative care:  HIV: NR in 2016  Tdap: 2016  PAP Smear: Today. Last pap 2016 NILM. HPV not tested  Any family history of gyn cancers (breast, ovarian or cervical ca)? Cousin breast.   Do you feel safe at home? Yes   Diet: Described as \"normal\" diet. Tries to be balanced. Exercise: No time, always on her feet walking. Going to get a treadmill. Gyn History      Patient's last menstrual period was 2019. Age at which menses began: 7 yo   Length of periods: 5 days   Number of days between periods: Every month   Menstrual flow: initially heavy, normal flow   Number of sexual partners: 1   Type of sexual partners: Male  Method of family planning: condoms     Past Medical History  Past Medical History:   Diagnosis Date    Pap smear for cervical cancer screening     Postpartum depression 2017     Current Medications   No current outpatient medications on file prior to visit. No current facility-administered medications on file prior to visit. Surgical History  No past surgical history on file.     Family History   Family History   Problem Relation Age of Onset    No Known Problems Mother     No Known Problems Father      Social History  Social History     Socioeconomic History    Marital status:      Spouse name: Not on file    Number of children: Not on file    Years of education: Not on file    Highest education level: Not on file   Tobacco Use    Smoking status: Never Smoker    Smokeless tobacco: Never Used   Substance and Sexual Activity    Alcohol use: No     Comment: occasional    Drug use: No    Sexual activity: Yes     Partners: Male     Birth control/protection: None     Objective     Visit Vitals  /78 (BP 1 Location: Left arm, BP Patient Position: Sitting)   Pulse 100   Temp 98.6 °F (37 °C) (Oral)   Resp 18   Ht 5' 3\" (1.6 m)   Wt 149 lb 6.4 oz (67.8 kg)   LMP 12/11/2019   SpO2 99%   BMI 26.47 kg/m²     PHYSICAL EXAM   GEN: No apparent distress. Alert and oriented and responds to all questions appropriately. EYES:  Conjunctiva clear; pupils round and reactive to light; extraocular movements are intact. EAR: External ears are normal.  Tympanic membranes are clear and without effusion. NOSE: Turbinates are within normal limits. No drainage  OROPHYARYNX: No oral lesions or exudates. NECK:  Supple; no masses; thyroid normal           BREAST: Breasts appear normal, no suspicious masses, no skin or nipple changes or axillary nodes. Mild tenderness to palpation at left lateral breast.   LUNGS: Respirations unlabored; clear to auscultation bilaterally  CARDIOVASCULAR: Regular, rate, and rhythm without murmurs, gallops or rubs   ABDOMEN: Soft; nontender; nondistended; normoactive bowel sounds; no masses or organomegaly    Physical Exam  Genitourinary:            Genitourinary Comments: Cyst present     PELVIC: VULVA: normal appearing vulva with no masses, tenderness or lesions, left vulvar nodule present as in image above. Non erythematous , PAP: Pap smear done today  NEUROLOGIC: No focal neurologic deficits. Strength and sensation grossly intact. Coordination and gait grossly intact. EXT: Well perfused. No edema.   SKIN: No obvious loraine Smiley   Melissa Walters is a 30 y.o. female presenting to clinic today for routine annual exam.    Plan   1. Well woman exam with routine gynecological exam  - f/u pap smear result  - received flu vaccine today   - Counseled re: diet, exercise, healthy lifestyle  - Return for yearly wellness visits    2. Vaginal cyst  - No evidence of infection   - Advised applying warm compresses   - Continue to monitor and return if symptoms worsen or do not improve    3. Birth control counseling  - Patient interested in getting an IUD   - Due to lack of insurance, provided with information for [facility] department and [facility] as alternate clinics    4. Breast pain in female  - Breast pain likely muscular in nature due to strain while working. No masses or lesions noted   - Advised NSAID use to relieve pain. - Return if symptoms worsen or do not improve    I discussed the aforementioned diagnoses with the patient as well as the plan of care. All questions were answered and an AVS was provided.      Patient discussed with Dr. Faustino Brady (Attending Physician)      Signed By:  Gerda Trimble MD

## 2020-01-06 ENCOUNTER — OFFICE VISIT (OUTPATIENT)
Dept: FAMILY MEDICINE CLINIC | Age: 35
End: 2020-01-06

## 2020-01-06 ENCOUNTER — HOSPITAL ENCOUNTER (OUTPATIENT)
Dept: LAB | Age: 35
Discharge: HOME | End: 2020-01-06
Payer: SELF-PAY

## 2020-01-06 VITALS
TEMPERATURE: 98.6 F | OXYGEN SATURATION: 99 % | DIASTOLIC BLOOD PRESSURE: 78 MMHG | HEIGHT: 63 IN | HEART RATE: 100 BPM | RESPIRATION RATE: 18 BRPM | BODY MASS INDEX: 26.47 KG/M2 | WEIGHT: 149.4 LBS | SYSTOLIC BLOOD PRESSURE: 116 MMHG

## 2020-01-06 DIAGNOSIS — Z30.09 BIRTH CONTROL COUNSELING: ICD-10-CM

## 2020-01-06 DIAGNOSIS — N64.4 BREAST PAIN IN FEMALE: ICD-10-CM

## 2020-01-06 DIAGNOSIS — Z01.419 WELL WOMAN EXAM WITH ROUTINE GYNECOLOGICAL EXAM: Primary | ICD-10-CM

## 2020-01-06 DIAGNOSIS — N89.8 VAGINAL CYST: ICD-10-CM

## 2020-01-06 PROCEDURE — 87624 HPV HI-RISK TYP POOLED RSLT: CPT

## 2020-01-06 PROCEDURE — 88175 CYTOPATH C/V AUTO FLUID REDO: CPT

## 2020-01-06 NOTE — PROGRESS NOTES
Maribel Paulino is a 30 y.o. female  nonfasting  Patient expressed concerns about left side of breast and under arm soreness for 6 months . 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 Patient also expressed concerns about vaginal area with bump for 1 month. Chief Complaint   Patient presents with    Complete Physical    Gyn Exam       1. Have you been to the ER, urgent care clinic since your last visit? Hospitalized since your last visit?no  M  2. Have you seen or consulted any other health care providers outside of the [address] [place] [address] since your last visit? Include any pap smears or colon screening. No      Visit Vitals  /78 (BP 1 Location: Left arm, BP Patient Position: Sitting)   Pulse 100   Temp 98.6 °F (37 °C) (Oral)   Resp 18   Ht 5' 3\" (1.6 m)   Wt 149 lb 6.4 oz (67.8 kg)   SpO2 99%   BMI 26.47 kg/m²           Health Maintenance Due   Topic Date Due    PAP AKA CERVICAL CYTOLOGY  07/26/2019    Influenza Age 5 to Adult  08/01/2019         Medication Reconciliation completed, changes noted.   Please  Update medication list.

## 2020-05-18 ENCOUNTER — TELEPHONE (OUTPATIENT)
Dept: FAMILY MEDICINE CLINIC | Age: 35
End: 2020-05-18

## 2020-05-18 NOTE — TELEPHONE ENCOUNTER
Called and no answer, voice mail not set up. Called the 2nd time with [employer], no answer and no voice mail.

## 2020-05-18 NOTE — TELEPHONE ENCOUNTER
----- Message from Bradford Pelletier sent at 5/15/2020  3:42 PM EDT -----  Regarding: Dr. Childs/Telephone  Pt is requesting a call to schedule a follow up appt with Dr. Christy Jj only, and to discuss ongoing severe headaches. Best contact number [phone].

## 2020-06-30 ENCOUNTER — TELEPHONE (OUTPATIENT)
Dept: FAMILY MEDICINE CLINIC | Age: 35
End: 2020-06-30

## 2020-06-30 NOTE — TELEPHONE ENCOUNTER
----- Message from [employer] sent at 6/29/2020  1:50 PM EDT -----  Regarding: Dr. Isidro Masters first and last name and relationship to patient (if not the patient): PT  Best contact number: [phone]  Preferred date and time: ASAP - In office  Scheduled appointment date and time: N/A  Reason for appointment: Pt has a \"cyst like\" bump inside her vagina, it has changed color and is painful when she walks.    Details to clarify the request:

## 2020-07-01 ENCOUNTER — TELEPHONE (OUTPATIENT)
Dept: FAMILY MEDICINE CLINIC | Age: 35
End: 2020-07-01

## 2020-07-01 ENCOUNTER — VIRTUAL VISIT (OUTPATIENT)
Dept: FAMILY MEDICINE CLINIC | Age: 35
End: 2020-07-01

## 2020-07-01 DIAGNOSIS — N90.7 LABIAL CYST: Primary | ICD-10-CM

## 2020-07-01 NOTE — TELEPHONE ENCOUNTER
Called patient back, wanted to give her the clinic number for [facility]. [phone]. Referral sent and pt to call their clinic to schedule appointment.      Katelyn Craven MD  1:27 PM

## 2020-07-01 NOTE — TELEPHONE ENCOUNTER
----- Message from [employer] sent at 7/1/2020 11:24 AM EDT -----  Regarding: Dr. Marilyn Jacobson first and last name and relationship (if not the patient): PT  Best contact number(s):[phone] or  [phone]  Whose call is being returned: Unknown  Details to clarify the request:

## 2020-07-01 NOTE — PROGRESS NOTES
Peter Sanchezchild  30 y.o. female  1985  [facility] [phone] (home) [phone] (work)     [address]:    Telephone Encounter  Kalli Bermeo MD       Encounter Date: 7/1/2020 at 8:00 AM    Consent:  She and/or the health care decision maker is aware that that she may receive a bill for this telephone service, depending on her insurance coverage, and has provided verbal consent to proceed: Yes    Chief Complaint   Patient presents with    Cyst       History of Present Illness   Peter Foote is a 30 y.o. female was evaluated by synchronous (real-time) audio technology from home, through the phone only. I communicated with the patient and/or health care decision maker about \"vaginal cyst\". Patient reports she first noticed the cyst 1 year ago, was seen and documented in clinic January 2020 at which time the cyst was not bothering her. Now pt reports the cyst has changed from skin color to a darker \"bruised\" color and is more painful now, especially when she is walking around a lot at work at [employer]. Had tried warm compresses in the past without relief. Denies any bleeding, drainage, puss, or injury. LMP: 6/16/20. 2020 Pap NILM, HPV neg, repeat in 5 years. Pt denies any fever, chills, cough, N/V, or other complaint at this time. Per chart review, prior cyst appears to be on L upper labia and pt says this is worsening of that same cyst.     Review of Systems   Review of Systems   Constitutional: Negative for chills and fever. Respiratory: Negative for cough and shortness of breath. Gastrointestinal: Negative for abdominal pain, nausea and vomiting. Genitourinary: Negative for dysuria and hematuria. Vaginal pain with walking at site of cyst   Skin: Negative for itching. Neurological: Negative for dizziness and headaches. Vitals/Objective:   General: Patient speaking in complete sentences without effort. Normal speech and cooperative. Due to this being a Virtual Check-in/Telephone evaluation, many elements of the physical examination are unable to be assessed. Assessment and Plan: Total Time: minutes: 11-20 minutes    Betzaida Barnes is a 30 y.o. female with likely labial cyst with worsening symptoms needing further evaluation     1. Labial cyst - worsening, now painful with walking, larger than before, and color change (normal color to now \"bruising\" color)  - REFERRAL TO [facility], pt given clinic number to call to set up appointment as cyst may need I&D      We discussed the expected course, resolution and complications of the diagnosis(es) in detail. Medication risks, benefits, costs, interactions, and alternatives were discussed as indicated. I advised her to contact the office if her condition worsens, changes or fails to improve as anticipated. She expressed understanding with the diagnosis(es) and plan. Patient understands that this encounter was a temporary measure, and the importance of further follow up and examination was emphasized. Patient verbalized understanding. Patient informed to follow up: Make OBGYN apt, and follow up with PCP as needed    I affirm this is a Patient Initiated Episode with an Established Patient who has not had a related appointment within my department in the past 7 days or scheduled within the next 24 hours.   Note: not billable if this call serves to triage the patient into an appointment for the relevant concern      Electronically Signed: Laurita Foster MD  Providers location when delivering service: Home        ICD-10-CM ICD-9-CM    1. Labial cyst N90.7 624.8 REFERRAL TO OBSTETRICS AND GYNECOLOGY       Pursuant to the emergency declaration under the [facility], [facility] waiver authority and the [employer] Act, this Virtual  Visit was conducted, with patient's consent, to reduce the patient's risk of exposure to COVID-19 and provide continuity of care for an established patient. History   Patients past medical, surgical and family histories were personally reviewed. Past Medical History:   Diagnosis Date    Pap smear for cervical cancer screening 2016    Postpartum depression 4/18/2017     No past surgical history on file.   Family History   Problem Relation Age of Onset    No Known Problems Mother     No Known Problems Father      Social History     Socioeconomic History    Marital status: SINGLE     Spouse name: Not on file    Number of children: Not on file    Years of education: Not on file    Highest education level: Not on file   Occupational History    Not on file   Social Needs    Financial resource strain: Not on file    Food insecurity     Worry: Not on file     Inability: Not on file    Transportation needs     Medical: Not on file     Non-medical: Not on file   Tobacco Use    Smoking status: Never Smoker    Smokeless tobacco: Never Used   Substance and Sexual Activity    Alcohol use: No     Comment: occasional    Drug use: No    Sexual activity: Yes     Partners: Male     Birth control/protection: None   Lifestyle    Physical activity     Days per week: Not on file     Minutes per session: Not on file    Stress: Not on file   Relationships    Social connections     Talks on phone: Not on file     Gets together: Not on file     Attends Oriental orthodox service: Not on file     Active member of club or organization: Not on file     Attends meetings of clubs or organizations: Not on file     Relationship status: Not on file    Intimate partner violence     Fear of current or ex partner: Not on file     Emotionally abused: Not on file     Physically abused: Not on file     Forced sexual activity: Not on file   Other Topics Concern    Not on file   Social History Narrative    Not on file            Current Medications/Allergies Medications and Allergies reviewed:       Allergies   Allergen Reactions    Sertraline Nausea Only

## 2020-07-16 NOTE — PROGRESS NOTES
[address] Medicine Residency Attending Addendum:  Dr. Unique Amaya MD,  the patient and I were not physically present during this encounter. The resident and I are concurrently monitoring the patient care through appropriate telecommunication technology. I discussed the findings, assessment and plan with the resident and agree with the resident's findings and plan as documented in the resident's note.       Quique Arias MD

## 2020-07-17 NOTE — PROGRESS NOTES
Chief Complaint   Mass (vaginal)      ADITI Kendall is a 30 y.o. female who presents for the evaluation of a vaginal bump on the left outer labia. Patient's last menstrual period was 07/15/2020 (exact date). The patient complains of a mildly painful bump. Associated symptoms: none. Aggravating symptoms: walking (worked as , walked a lot at work). Alleviating factors: warm baths. First noted labial cyst 9-10 months ago. AE January, advised benign, recommended sitz baths. Only mildly tender at that time. Did sitz baths (almost daily), no improvement. Then decreased to once every 1-2wks. Since then, has increased in size, more tender, has changed color (was pink, now purplish). Did last sitz bath about a week ago. No drainage. Did try to express it when she first noticed it -> did not get anything. No F/C. No unusual vaginal discharge. No STD concerns. Pap/HPV neg 2020. Past Medical History:   Diagnosis Date    Pap smear for cervical cancer screening     Postpartum depression 2017    Stillbirth 2017    CS for fetal bradycardia. Noted to be hydropic.   Per pt, was told baby had cancer (?lymphatic?)     Past Surgical History:   Procedure Laterality Date    HX  SECTION  2017     Social History     Occupational History    Not on file   Tobacco Use    Smoking status: Never Smoker    Smokeless tobacco: Never Used   Substance and Sexual Activity    Alcohol use: No     Comment: occasional    Drug use: No    Sexual activity: Yes     Partners: Male     Birth control/protection: None     Family History   Problem Relation Age of Onset    No Known Problems Mother     No Known Problems Father     Hypertension Maternal Grandmother        Allergies   Allergen Reactions    Sertraline Nausea Only     Prior to Admission medications    Not on File        Review of Systems: History obtained from the patient  Constitutional: negative for weight loss, fever, night sweats  HEENT: negative for hearing loss, earache, congestion, snoring, sorethroat  CV: negative for chest pain, palpitations, edema  Resp: negative for cough, shortness of breath, wheezing  Breast: negative for breast lumps, nipple discharge, galactorrhea  GI: negative for change in bowel habits, abdominal pain, black or bloody stools  : negative for frequency, dysuria, hematuria, vaginal discharge  MSK: negative for back pain, joint pain, muscle pain  Skin: negative for itching, rash, hives  Neuro: negative for dizziness, headache, confusion, weakness  Psych: negative for anxiety, depression, change in mood  Heme/lymph: negative for bleeding, bruising, pallor    Objective:  Visit Vitals  BP 96/59 (BP 1 Location: Left arm, BP Patient Position: Sitting)   Wt 158 lb (71.7 kg)   LMP 07/15/2020 (Exact Date)   BMI 27.99 kg/m²       Physical Exam:   PHYSICAL EXAMINATION    Constitutional  · Appearance: well-nourished, well developed, alert, in no acute distress    HENT  · Head and Face: appears normal    Gastrointestinal  · Abdominal Examination: abdomen non-tender to palpation, normal bowel sounds, no masses present  · Liver and spleen: no hepatomegaly present, spleen not palpable  · Hernias: no hernias identified    Genitourinary  · External Genitalia:         Skin  · General Inspection: no rash, no lesions identified    Neurologic/Psychiatric  · Mental Status:  · Orientation: grossly oriented to person, place and time  · Mood and Affect: mood normal, affect appropriate    Assessment:   Vulvar cyst. Appears to be blood. ?possible endometrioma? Plan:   I&D of vulvar cyst (see note below). Sitz baths  Discussed possibility of endometrioma. If recurs, RTO. Would then likely need excision in OR.           [facility]  OFFICE PROCEDURE PROGRESS NOTE        Chart reviewed for the following:   Asiya Wilkes MD, have reviewed the History, Physical and updated the Allergic reactions for 7 Eyrn Zimmer performed immediately prior to start of procedure:   Fish Huerta MD, have performed the following reviews on Katie Serra prior to the start of the procedure:            * Patient was identified by name and date of birth   * Agreement on procedure being performed was verified  * Risks and Benefits explained to the patient  * Procedure site verified and marked as necessary  * Patient was positioned for comfort  * Consent was signed and verified     Time: 8662      Date of procedure: 7/20/2020    Procedure performed by:  All Jaimes MD    Provider assisted by: ANDRÉS Dior MA    Patient assisted by: self    How tolerated by patient: tolerated the procedure well with no complications    Post Procedural Pain Scale: 0 - No Hurt    Comments: none    Procedure note: Vulvar/Vaginal biopsy    Indications:  Katie Serra is a 30 y.o. female OTHER that was found to have a vulvar cyst. After being presented with the risks, benefits and specific details of the procedure, she had no further questions. Procedure: The patient was placed on the table in a dorsal lithotomy position. The area was prepped with betadine. Once cleansed, the area was injected with three cc's of 1% Lidocaine without epinephrine, using a 27 gauge needle. A small amount of drainage of \"chocolate\" fluid drained spontaneously from the injection site. After adequate anesthesia was reached, the cyst was nicked with a scalpel. A small amount of additional chocolate fluld drained. The cyst was explored with a curved hemostat, no additional drainage noted. The cyst appeared completely drained and flat. Silver nitrate was applied with good hemostasis confirmed. Pressure was applied to the biopsy site to control bleeding and no sutures were placed to close the wound. Hemostasis was adequate with direct pressure and silver nitrate. The patient tolerated the procedure well.  There were no complications. She was observed for 10 minutes and was discharged in good condition.

## 2020-07-20 ENCOUNTER — OFFICE VISIT (OUTPATIENT)
Dept: OBGYN CLINIC | Age: 35
End: 2020-07-20

## 2020-07-20 VITALS — DIASTOLIC BLOOD PRESSURE: 59 MMHG | BODY MASS INDEX: 27.99 KG/M2 | WEIGHT: 158 LBS | SYSTOLIC BLOOD PRESSURE: 96 MMHG

## 2020-07-20 DIAGNOSIS — N90.7 VULVAR CYST: Primary | ICD-10-CM

## 2024-07-18 ENCOUNTER — TELEPHONE (OUTPATIENT)
Facility: CLINIC | Age: 39
End: 2024-07-18

## 2024-07-18 NOTE — TELEPHONE ENCOUNTER
Left pt vm to call back if she wanted to schedule an appointment          ----- Message from Zully Napoles MA sent at 7/18/2024  9:51 AM EDT -----  Regarding: FW: [facility] Appointment Request    ----- Message -----  From: Basia Dey  Sent: 7/18/2024   9:38 AM EDT  To: #  Subject: [facility] Appointment Request                          [facility] Appointment Request    Patient needs appointment for [facility] Appointment Type: New Patient.    Patient Requested Dates(s): As soon as possible  Patient Requested Time:  Provider Name: MELISA Phillips-CNP      Reason for Appointment Request: New Patient - Requested Provider unavailable  --------------------------------------------------------------------------------------------------------------------------    Relationship to Patient: Self     Call Back Information: OK to leave message on voicemail  Preferred Call Back Number: [phone]

## 2024-09-30 ENCOUNTER — TELEPHONE (OUTPATIENT)
Facility: CLINIC | Age: 39
End: 2024-09-30

## 2024-09-30 NOTE — TELEPHONE ENCOUNTER
----- Message from Ma. R sent at 9/30/2024 11:16 AM EDT -----  Regarding: [facility] Appointment Request  [facility] Appointment Request    Patient needs appointment for [facility] Appointment Type: New to Provider.    Patient Requested Dates(s): December 2  Patient Requested Time: 3:30PM  Provider Name: Matilda Araiza MD    Reason for Appointment Request: Established Patient - Available appointments did not meet patient need    Other: Patient wanted to be listed as well on the cancellation list and please reach out to her if there's an earlier available appointment. Thank you very much.   --------------------------------------------------------------------------------------------------------------------------    Relationship to Patient: Self     Call Back Information: OK to leave message on voicemail  Preferred Call Back Number: Phone [phone] (home)

## 2024-11-27 ENCOUNTER — OFFICE VISIT (OUTPATIENT)
Facility: CLINIC | Age: 39
End: 2024-11-27
Payer: COMMERCIAL

## 2024-11-27 VITALS
OXYGEN SATURATION: 97 % | RESPIRATION RATE: 16 BRPM | WEIGHT: 155 LBS | BODY MASS INDEX: 28.52 KG/M2 | TEMPERATURE: 97.2 F | HEIGHT: 62 IN | SYSTOLIC BLOOD PRESSURE: 100 MMHG | DIASTOLIC BLOOD PRESSURE: 78 MMHG | HEART RATE: 84 BPM

## 2024-11-27 DIAGNOSIS — Z13.1 SCREENING FOR DIABETES MELLITUS: ICD-10-CM

## 2024-11-27 DIAGNOSIS — D35.2 PROLACTINOMA (HCC): ICD-10-CM

## 2024-11-27 DIAGNOSIS — Z76.89 ENCOUNTER TO ESTABLISH CARE: Primary | ICD-10-CM

## 2024-11-27 DIAGNOSIS — Z13.220 SCREENING FOR CHOLESTEROL LEVEL: ICD-10-CM

## 2024-11-27 PROCEDURE — 99203 OFFICE O/P NEW LOW 30 MIN: CPT

## 2024-11-27 RX ORDER — RIBOFLAVIN (VITAMIN B2) 100 MG
100 TABLET ORAL
COMMUNITY
Start: 2024-03-23

## 2024-11-27 SDOH — ECONOMIC STABILITY: FOOD INSECURITY: WITHIN THE PAST 12 MONTHS, THE FOOD YOU BOUGHT JUST DIDN'T LAST AND YOU DIDN'T HAVE MONEY TO GET MORE.: NEVER TRUE

## 2024-11-27 SDOH — ECONOMIC STABILITY: INCOME INSECURITY: HOW HARD IS IT FOR YOU TO PAY FOR THE VERY BASICS LIKE FOOD, HOUSING, MEDICAL CARE, AND HEATING?: NOT HARD AT ALL

## 2024-11-27 SDOH — ECONOMIC STABILITY: FOOD INSECURITY: WITHIN THE PAST 12 MONTHS, YOU WORRIED THAT YOUR FOOD WOULD RUN OUT BEFORE YOU GOT MONEY TO BUY MORE.: NEVER TRUE

## 2024-11-27 ASSESSMENT — PATIENT HEALTH QUESTIONNAIRE - PHQ9
SUM OF ALL RESPONSES TO PHQ QUESTIONS 1-9: 0
SUM OF ALL RESPONSES TO PHQ9 QUESTIONS 1 & 2: 0
4. FEELING TIRED OR HAVING LITTLE ENERGY: NOT AT ALL
7. TROUBLE CONCENTRATING ON THINGS, SUCH AS READING THE NEWSPAPER OR WATCHING TELEVISION: NOT AT ALL
6. FEELING BAD ABOUT YOURSELF - OR THAT YOU ARE A FAILURE OR HAVE LET YOURSELF OR YOUR FAMILY DOWN: NOT AT ALL
10. IF YOU CHECKED OFF ANY PROBLEMS, HOW DIFFICULT HAVE THESE PROBLEMS MADE IT FOR YOU TO DO YOUR WORK, TAKE CARE OF THINGS AT HOME, OR GET ALONG WITH OTHER PEOPLE: NOT DIFFICULT AT ALL
8. MOVING OR SPEAKING SO SLOWLY THAT OTHER PEOPLE COULD HAVE NOTICED. OR THE OPPOSITE, BEING SO FIGETY OR RESTLESS THAT YOU HAVE BEEN MOVING AROUND A LOT MORE THAN USUAL: NOT AT ALL
SUM OF ALL RESPONSES TO PHQ QUESTIONS 1-9: 0
1. LITTLE INTEREST OR PLEASURE IN DOING THINGS: NOT AT ALL
9. THOUGHTS THAT YOU WOULD BE BETTER OFF DEAD, OR OF HURTING YOURSELF: NOT AT ALL
SUM OF ALL RESPONSES TO PHQ QUESTIONS 1-9: 0
SUM OF ALL RESPONSES TO PHQ QUESTIONS 1-9: 0
5. POOR APPETITE OR OVEREATING: NOT AT ALL
2. FEELING DOWN, DEPRESSED OR HOPELESS: NOT AT ALL
3. TROUBLE FALLING OR STAYING ASLEEP: NOT AT ALL

## 2024-11-27 ASSESSMENT — ENCOUNTER SYMPTOMS
ABDOMINAL PAIN: 0
CHEST TIGHTNESS: 0
SHORTNESS OF BREATH: 0
WHEEZING: 0

## 2024-11-27 NOTE — PROGRESS NOTES
Establishing Care    Subjective  Chief Complaint   Patient presents with    New Patient     1. Encounter to establish care  HPI:  Marlee Xiao is a 39 y.o. female. She presents today to establish care. Patient reports she had a premature baby in March with [facility]. , last delivery was . She is still breast feeding, declines birth control.  Last PAP was 2023, denies any abnormal in the past. Denies non-OB related medical history or surgeries other than prolactinoma.     2. Prolactinoma (HCC)  It was discovered by [place] during process of trying to conceive. Patient is currently breast feeding. She did see an Endocrinologist over the summer and because she was breast feeding was told to return early , she states she has an appointment scheduled .    Specialists  OB/Gyn-Geri Montez  Endocrinology- Felipa Martinez    Past Medical History:   Diagnosis Date    History of postpartum depression     After stillbirth    History of stillbirth 2017    CS for fetal bradycardia. Noted to be hydropic. Per pt, was told baby had cancer (?lymphatic?)    Prolactinoma (HCC)      Past Surgical History:   Procedure Laterality Date     SECTION  2017     SECTION  2024      Family History   Problem Relation Age of Onset    No Known Problems Mother     No Known Problems Father     Hypertension Maternal Grandmother      Social History     Socioeconomic History    Marital status: Single     Spouse name: Not on file    Number of children: Not on file    Years of education: Not on file    Highest education level: Not on file   Occupational History    Not on file   Tobacco Use    Smoking status: Never    Smokeless tobacco: Never   Vaping Use    Vaping status: Never Used   Substance and Sexual Activity    Alcohol use: No    Drug use: No    Sexual activity: Not on file   Other Topics Concern    Not on file   Social History Narrative

## 2024-11-27 NOTE — ASSESSMENT & PLAN NOTE
Monitored by specialist- no acute findings meriting change in the plan.   -Patient seeing Dr. Felipa Martinez (Endocrinology) on January 8, 2025  -No monitoring of prolactin indicated today as patient is still breast feeding

## 2024-11-27 NOTE — PROGRESS NOTES
Chief Complaint   Patient presents with    New Patient     No data recorded  \"Have you been to the ER, urgent care clinic since your last visit?  Hospitalized since your last visit?\"    NO    “Have you seen or consulted any other health care providers outside our system since your last visit?”    NO      Does [facility] PORTILLO         Click Here for Release of Records Request     Wt 70.3 kg (155 lb)   LMP 11/13/2024        No data to display

## 2024-11-28 LAB
CHOLEST SERPL-MCNC: 163 MG/DL (ref 100–199)
HBA1C MFR BLD: 5 % (ref 4.8–5.6)
HDLC SERPL-MCNC: 66 MG/DL
LDLC SERPL CALC-MCNC: 87 MG/DL (ref 0–99)
TRIGL SERPL-MCNC: 47 MG/DL (ref 0–149)
VLDLC SERPL CALC-MCNC: 10 MG/DL (ref 5–40)

## 2025-01-29 NOTE — PROGRESS NOTES
Paged supervisor via . Performing Laboratory: [id number] Billing Type: Third-Party Bill Expected Date Of Service: 01/29/2025 Bill For Surgical Tray: no

## (undated) DEVICE — BASIN SPNG 32OZ BLU STRL --

## (undated) DEVICE — SOLIDIFIER FLUID 3000 CC ABSORB

## (undated) DEVICE — MASTISOL ADHESIVE LIQ 2/3ML

## (undated) DEVICE — GOWN,AURORA,FABRIC-REINFORCED,X-LARGE: Brand: MEDLINE

## (undated) DEVICE — 3000CC GUARDIAN II: Brand: GUARDIAN

## (undated) DEVICE — TRAY PREP DRY W/ PREM GLV 2 APPL 6 SPNG 2 UNDPD 1 OVERWRAP

## (undated) DEVICE — SUTURE VCRL SZ 0 L36IN ABSRB VLT L40MM CT 1/2 CIR J358H

## (undated) DEVICE — HANDLE LT SNAP ON ULT DURABLE LENS FOR TRUMPF ALC DISPOSABLE

## (undated) DEVICE — BLADE ASSEMB CLP HAIR FINE --

## (undated) DEVICE — 3M™ MEDIPORE™ H SOFT CLOTH SURGICAL TAPE, 2863, 3 IN X 10 YD, 12/CASE: Brand: 3M™ MEDIPORE™

## (undated) DEVICE — STAPLER SKIN H3.9MM WIRE DIA0.58MM CRWN 6.9MM 35 STPL ROT

## (undated) DEVICE — (D)STRIP SKN CLSR 0.5X4IN WHT --

## (undated) DEVICE — TRAY CATH OD16FR SIL URIN M STATLOK STBL DEV SURSTP

## (undated) DEVICE — STERILE POLYISOPRENE POWDER-FREE SURGICAL GLOVES: Brand: PROTEXIS

## (undated) DEVICE — (D)PREP SKN CHLRAPRP APPL 26ML -- CONVERT TO ITEM 371833

## (undated) DEVICE — SUTURE VCRL SZ 2-0 L27IN ABSRB VLT L40MM CT 1/2 CIR J351H

## (undated) DEVICE — Z DUP USE 2227076 BARRIER ADH TISS 4-SECTION SEPRAFILM

## (undated) DEVICE — TOWEL,OR,DSP,ST,BLUE,STD,2/PK,40PK/CS: Brand: MEDLINE

## (undated) DEVICE — ABDOMINAL PAD: Brand: DERMACEA

## (undated) DEVICE — KENDALL SCD EXPRESS SLEEVES, KNEE LENGTH, MEDIUM: Brand: KENDALL SCD

## (undated) DEVICE — ROCKER SWITCH PENCIL HOLSTER: Brand: VALLEYLAB

## (undated) DEVICE — INTENDED FOR TISSUE SEPARATION, AND OTHER PROCEDURES THAT REQUIRE A SHARP SURGICAL BLADE TO PUNCTURE OR CUT.: Brand: BARD-PARKER ®  SAFETY SCALPED

## (undated) DEVICE — SOLUTION IV 1000ML 0.9% SOD CHL

## (undated) DEVICE — C-SECTION II-LF: Brand: MEDLINE INDUSTRIES, INC.

## (undated) DEVICE — TIP CLEANER: Brand: VALLEYLAB

## (undated) DEVICE — 1200 GUARD II KIT W/5MM TUBE W/O VAC TUBE: Brand: GUARDIAN

## (undated) DEVICE — REM POLYHESIVE ADULT PATIENT RETURN ELECTRODE: Brand: VALLEYLAB

## (undated) DEVICE — SUTURE MCRYL SZ 0 L36IN ABSRB UD L36MM CT-1 1/2 CIR Y946H

## (undated) DEVICE — SPONGE: LAP 18X18 W  200/CS: Brand: MEDICAL ACTION INDUSTRIES

## (undated) DEVICE — SUTURE PLN GUT SZ 2-0 L27IN ABSRB YELLOWISH TAN L70MM XLH 53T